# Patient Record
Sex: MALE | Race: WHITE | NOT HISPANIC OR LATINO | Employment: OTHER | URBAN - METROPOLITAN AREA
[De-identification: names, ages, dates, MRNs, and addresses within clinical notes are randomized per-mention and may not be internally consistent; named-entity substitution may affect disease eponyms.]

---

## 2022-12-04 SDOH — ECONOMIC STABILITY: TRANSPORTATION INSECURITY
IN THE PAST 12 MONTHS, HAS LACK OF TRANSPORTATION KEPT YOU FROM MEETINGS, WORK, OR FROM GETTING THINGS NEEDED FOR DAILY LIVING?: NO

## 2022-12-04 SDOH — ECONOMIC STABILITY: HOUSING INSECURITY
IN THE LAST 12 MONTHS, WAS THERE A TIME WHEN YOU DID NOT HAVE A STEADY PLACE TO SLEEP OR SLEPT IN A SHELTER (INCLUDING NOW)?: NO

## 2022-12-04 SDOH — ECONOMIC STABILITY: FOOD INSECURITY: WITHIN THE PAST 12 MONTHS, YOU WORRIED THAT YOUR FOOD WOULD RUN OUT BEFORE YOU GOT MONEY TO BUY MORE.: NEVER TRUE

## 2022-12-04 SDOH — ECONOMIC STABILITY: FOOD INSECURITY: WITHIN THE PAST 12 MONTHS, THE FOOD YOU BOUGHT JUST DIDN'T LAST AND YOU DIDN'T HAVE MONEY TO GET MORE.: NEVER TRUE

## 2022-12-04 SDOH — ECONOMIC STABILITY: INCOME INSECURITY: IN THE LAST 12 MONTHS, WAS THERE A TIME WHEN YOU WERE NOT ABLE TO PAY THE MORTGAGE OR RENT ON TIME?: NO

## 2022-12-04 SDOH — HEALTH STABILITY: PHYSICAL HEALTH: ON AVERAGE, HOW MANY MINUTES DO YOU ENGAGE IN EXERCISE AT THIS LEVEL?: 50 MIN

## 2022-12-04 SDOH — HEALTH STABILITY: MENTAL HEALTH
STRESS IS WHEN SOMEONE FEELS TENSE, NERVOUS, ANXIOUS, OR CAN'T SLEEP AT NIGHT BECAUSE THEIR MIND IS TROUBLED. HOW STRESSED ARE YOU?: ONLY A LITTLE

## 2022-12-04 SDOH — ECONOMIC STABILITY: INCOME INSECURITY: HOW HARD IS IT FOR YOU TO PAY FOR THE VERY BASICS LIKE FOOD, HOUSING, MEDICAL CARE, AND HEATING?: NOT VERY HARD

## 2022-12-04 SDOH — ECONOMIC STABILITY: HOUSING INSECURITY: IN THE LAST 12 MONTHS, HOW MANY PLACES HAVE YOU LIVED?: 1

## 2022-12-04 SDOH — ECONOMIC STABILITY: TRANSPORTATION INSECURITY
IN THE PAST 12 MONTHS, HAS THE LACK OF TRANSPORTATION KEPT YOU FROM MEDICAL APPOINTMENTS OR FROM GETTING MEDICATIONS?: NO

## 2022-12-04 SDOH — HEALTH STABILITY: PHYSICAL HEALTH: ON AVERAGE, HOW MANY DAYS PER WEEK DO YOU ENGAGE IN MODERATE TO STRENUOUS EXERCISE (LIKE A BRISK WALK)?: 7 DAYS

## 2022-12-04 SDOH — ECONOMIC STABILITY: TRANSPORTATION INSECURITY
IN THE PAST 12 MONTHS, HAS LACK OF RELIABLE TRANSPORTATION KEPT YOU FROM MEDICAL APPOINTMENTS, MEETINGS, WORK OR FROM GETTING THINGS NEEDED FOR DAILY LIVING?: NO

## 2022-12-04 ASSESSMENT — LIFESTYLE VARIABLES
HOW OFTEN DO YOU HAVE A DRINK CONTAINING ALCOHOL: 2-3 TIMES A WEEK
HOW OFTEN DO YOU HAVE SIX OR MORE DRINKS ON ONE OCCASION: NEVER
AUDIT-C TOTAL SCORE: 3
HOW MANY STANDARD DRINKS CONTAINING ALCOHOL DO YOU HAVE ON A TYPICAL DAY: 1 OR 2
SKIP TO QUESTIONS 9-10: 1

## 2022-12-04 ASSESSMENT — SOCIAL DETERMINANTS OF HEALTH (SDOH)
IN A TYPICAL WEEK, HOW MANY TIMES DO YOU TALK ON THE PHONE WITH FAMILY, FRIENDS, OR NEIGHBORS?: MORE THAN THREE TIMES A WEEK
DO YOU BELONG TO ANY CLUBS OR ORGANIZATIONS SUCH AS CHURCH GROUPS UNIONS, FRATERNAL OR ATHLETIC GROUPS, OR SCHOOL GROUPS?: NO
HOW MANY DRINKS CONTAINING ALCOHOL DO YOU HAVE ON A TYPICAL DAY WHEN YOU ARE DRINKING: 1 OR 2
IN A TYPICAL WEEK, HOW MANY TIMES DO YOU TALK ON THE PHONE WITH FAMILY, FRIENDS, OR NEIGHBORS?: MORE THAN THREE TIMES A WEEK
HOW OFTEN DO YOU HAVE SIX OR MORE DRINKS ON ONE OCCASION: NEVER
HOW OFTEN DO YOU GET TOGETHER WITH FRIENDS OR RELATIVES?: MORE THAN THREE TIMES A WEEK
HOW OFTEN DO YOU ATTEND CHURCH OR RELIGIOUS SERVICES?: 1 TO 4 TIMES PER YEAR
HOW OFTEN DO YOU GET TOGETHER WITH FRIENDS OR RELATIVES?: MORE THAN THREE TIMES A WEEK
WITHIN THE PAST 12 MONTHS, YOU WORRIED THAT YOUR FOOD WOULD RUN OUT BEFORE YOU GOT THE MONEY TO BUY MORE: NEVER TRUE
HOW HARD IS IT FOR YOU TO PAY FOR THE VERY BASICS LIKE FOOD, HOUSING, MEDICAL CARE, AND HEATING?: NOT VERY HARD
HOW OFTEN DO YOU HAVE A DRINK CONTAINING ALCOHOL: 2-3 TIMES A WEEK
HOW OFTEN DO YOU ATTENT MEETINGS OF THE CLUB OR ORGANIZATION YOU BELONG TO?: NEVER
HOW OFTEN DO YOU ATTEND CHURCH OR RELIGIOUS SERVICES?: 1 TO 4 TIMES PER YEAR
HOW OFTEN DO YOU ATTENT MEETINGS OF THE CLUB OR ORGANIZATION YOU BELONG TO?: NEVER
DO YOU BELONG TO ANY CLUBS OR ORGANIZATIONS SUCH AS CHURCH GROUPS UNIONS, FRATERNAL OR ATHLETIC GROUPS, OR SCHOOL GROUPS?: NO

## 2022-12-07 ENCOUNTER — OFFICE VISIT (OUTPATIENT)
Dept: MEDICAL GROUP | Facility: MEDICAL CENTER | Age: 72
End: 2022-12-07
Payer: MEDICARE

## 2022-12-07 VITALS
DIASTOLIC BLOOD PRESSURE: 72 MMHG | OXYGEN SATURATION: 98 % | TEMPERATURE: 98.6 F | SYSTOLIC BLOOD PRESSURE: 122 MMHG | WEIGHT: 173.72 LBS | HEART RATE: 83 BPM | HEIGHT: 69 IN | BODY MASS INDEX: 25.73 KG/M2

## 2022-12-07 DIAGNOSIS — L30.9 DERMATITIS: ICD-10-CM

## 2022-12-07 DIAGNOSIS — Z95.1 HISTORY OF CORONARY ARTERY BYPASS SURGERY: ICD-10-CM

## 2022-12-07 DIAGNOSIS — E78.5 HYPERLIPIDEMIA, UNSPECIFIED HYPERLIPIDEMIA TYPE: ICD-10-CM

## 2022-12-07 DIAGNOSIS — Z00.00 ENCOUNTER FOR MEDICAL EXAMINATION TO ESTABLISH CARE: ICD-10-CM

## 2022-12-07 DIAGNOSIS — N18.9 CHRONIC KIDNEY DISEASE, UNSPECIFIED CKD STAGE: ICD-10-CM

## 2022-12-07 DIAGNOSIS — G47.33 OSA (OBSTRUCTIVE SLEEP APNEA): ICD-10-CM

## 2022-12-07 DIAGNOSIS — E03.9 HYPOTHYROIDISM, UNSPECIFIED TYPE: ICD-10-CM

## 2022-12-07 DIAGNOSIS — Z11.59 NEED FOR HEPATITIS C SCREENING TEST: ICD-10-CM

## 2022-12-07 PROBLEM — I10 HYPERTENSION: Status: ACTIVE | Noted: 2022-12-07

## 2022-12-07 PROBLEM — I25.10 ARTERIOSCLEROSIS OF CORONARY ARTERY: Status: ACTIVE | Noted: 2022-12-07

## 2022-12-07 PROBLEM — Z95.828 HISTORY OF ARTERIAL BYPASS OF LOWER EXTREMITY: Status: ACTIVE | Noted: 2022-12-07

## 2022-12-07 PROBLEM — I71.40 ABDOMINAL AORTIC ANEURYSM (AAA) (HCC): Status: ACTIVE | Noted: 2022-12-07

## 2022-12-07 PROCEDURE — 99204 OFFICE O/P NEW MOD 45 MIN: CPT | Performed by: STUDENT IN AN ORGANIZED HEALTH CARE EDUCATION/TRAINING PROGRAM

## 2022-12-07 RX ORDER — ATORVASTATIN CALCIUM 40 MG/1
40 TABLET, FILM COATED ORAL NIGHTLY
COMMUNITY
End: 2023-01-06 | Stop reason: SDUPTHER

## 2022-12-07 RX ORDER — DILTIAZEM HYDROCHLORIDE 240 MG/1
240 CAPSULE, COATED, EXTENDED RELEASE ORAL DAILY
COMMUNITY
End: 2023-01-06 | Stop reason: SDUPTHER

## 2022-12-07 RX ORDER — LEVOTHYROXINE SODIUM 0.15 MG/1
150 TABLET ORAL
COMMUNITY
End: 2023-01-06 | Stop reason: SDUPTHER

## 2022-12-07 RX ORDER — RAMIPRIL 5 MG/1
5 CAPSULE ORAL DAILY
COMMUNITY
End: 2023-01-06 | Stop reason: SDUPTHER

## 2022-12-07 RX ORDER — OMEPRAZOLE 40 MG/1
40 CAPSULE, DELAYED RELEASE ORAL DAILY
COMMUNITY
End: 2023-01-06 | Stop reason: SDUPTHER

## 2022-12-07 RX ORDER — METOPROLOL SUCCINATE 25 MG/1
75 TABLET, EXTENDED RELEASE ORAL DAILY
COMMUNITY
End: 2023-01-06 | Stop reason: SDUPTHER

## 2022-12-07 RX ORDER — POTASSIUM CHLORIDE 1.5 G/1.58G
20 POWDER, FOR SOLUTION ORAL DAILY
COMMUNITY
End: 2023-01-06 | Stop reason: SDUPTHER

## 2022-12-07 ASSESSMENT — PATIENT HEALTH QUESTIONNAIRE - PHQ9: CLINICAL INTERPRETATION OF PHQ2 SCORE: 0

## 2022-12-07 NOTE — LETTER
[facility]  Lenora Ty M.D.  [address]  [place] [address]  Fax: [phone]   Authorization for Release/Disclosure of   Protected Health Information   Name: EVA FARR : 1950 SSN: [id number]   Address: [address] [place] [address] [address]  [place] [address] Phone:    [phone] (home)    I authorize the entity listed below to release/disclose the PHI below to:   [facility]/Lenora Ty M.D. and Lenora Ty M.D.   Provider or Entity Name:  [facility], [facility], [facility] Phone:      Fax:        Reason for request: continuity of care   Information to be released:    [ X ] LAST COLONOSCOPY,  including any PATH REPORT and follow-up  [ X ] LAST FIT/COLOGUARD RESULT [  ] LAST DEXA  [  ] LAST MAMMOGRAM  [  ] LAST PAP  [  ] LAST LABS [  ] RETINA EXAM REPORT  [  ] IMMUNIZATION RECORDS  [  ] Release all info      [  ] Check here and initial the line next to each item to release ALL health information INCLUDING  _____ Care and treatment for drug and / or alcohol abuse  _____ HIV testing, infection status, or AIDS  _____ Genetic Testing    DATES OF SERVICE OR TIME PERIOD TO BE DISCLOSED: _____________  I understand and acknowledge that:  * This Authorization may be revoked at any time by you in writing, except if your health information has already been used or disclosed.  * Your health information that will be used or disclosed as a result of you signing this authorization could be re-disclosed by the recipient. If this occurs, your re-disclosed health information may no longer be protected by State or Federal laws.  * You may refuse to sign this Authorization. Your refusal will not affect your ability to obtain treatment.  * This Authorization becomes effective upon signing and will  on (date) __________.      If no date is indicated, this Authorization will  one (1) year from the signature date.    Name: Eva  Ball    Signature:   Date:     12/7/2022       PLEASE FAX REQUESTED RECORDS BACK TO: [phone]

## 2022-12-07 NOTE — PROGRESS NOTES
"Subjective:     CC:  Diagnoses of Encounter for medical examination to establish care, Dermatitis, DHAVAL (obstructive sleep apnea), Chronic kidney disease, unspecified CKD stage, Hyperlipidemia, unspecified hyperlipidemia type, History of coronary artery bypass surgery, Hypothyroidism, unspecified type, and Need for hepatitis C screening test were pertinent to this visit.    HISTORY OF THE PRESENT ILLNESS: Patient is a 72 y.o. male. This pleasant patient is here today to establish care and discuss the following.    Problem   History of Arterial Bypass of Lower Extremity   History of Coronary Artery Bypass Surgery    CABG in 2014.  Currently on aspirin for antiplatelet therapy, metoprolol,  and a statin.     Hyperlipidemia    Atorvastatin 40 mg nightly.  No recent lipid panel     Hypertension   Abdominal Aortic Aneurysm (Aaa)   Arteriosclerosis of Coronary Artery   Dermatitis    Previously saw derm, used topical steroids and steroid injection. [facility] in [place]     Dhaval (Obstructive Sleep Apnea)    Uses CPAP daily     Ckd (Chronic Kidney Disease)    Unsure of stage due to no recent labs. Followed with nephrologist in [facility].     Hypothyroidism    Currently taking Synthroid 150 mcg daily.  He states that this has been variable and frequent adjustments on this medication, no recent TSH         Health Maintenance: need to obtain records    ROS:   ROS      Objective:     Exam: /72   Pulse 83   Temp 37 °C (98.6 °F) (Temporal)   Ht 1.753 m (5' 9\")   Wt 78.8 kg (173 lb 11.6 oz)   SpO2 98%  Body mass index is 25.65 kg/m².    Physical Exam  Constitutional:       General: He is not in acute distress.     Appearance: He is not toxic-appearing.   HENT:      Head: Normocephalic and atraumatic.      Right Ear: External ear normal.      Left Ear: External ear normal.   Eyes:      General:         Right eye: No discharge.         Left eye: No discharge.      Extraocular Movements: Extraocular " movements intact.      Conjunctiva/sclera: Conjunctivae normal.   Cardiovascular:      Rate and Rhythm: Normal rate and regular rhythm.      Pulses: Normal pulses.      Heart sounds: Normal heart sounds. No murmur heard.  Pulmonary:      Effort: Pulmonary effort is normal. No respiratory distress.      Breath sounds: Normal breath sounds. No wheezing.   Abdominal:      General: Abdomen is flat. Bowel sounds are normal. There is no distension.      Palpations: Abdomen is soft.      Tenderness: There is no abdominal tenderness.   Musculoskeletal:      Cervical back: Normal range of motion.   Skin:     General: Skin is warm and dry.      Capillary Refill: Capillary refill takes less than 2 seconds.   Neurological:      Mental Status: He is alert.   Psychiatric:         Mood and Affect: Mood normal.         Behavior: Behavior normal.         Thought Content: Thought content normal.         Judgment: Judgment normal.         Assessment & Plan:   72 y.o. male with the following -    1. Encounter for medical examination to establish care  Medical history, problem list, medications and allergies reviewed    2. Dermatitis  Referral to dermatology sent  - Referral to Dermatology    3. SILVIO (obstructive sleep apnea)  Continue using CPAP    4. Chronic kidney disease, unspecified CKD stage  CMP to assess CKD staging, if necessary will send to nephrology  - Comp Metabolic Panel; Future    5. Hyperlipidemia, unspecified hyperlipidemia type  Continue atorvastatin at current dosing, lipid profile done today  - Lipid Profile; Future    6. History of coronary artery bypass surgery  Continue aspirin for antiplatelet, atorvastatin and metoprolol at current dosing    7. Hypothyroidism, unspecified type  Continue levothyroxine at current dosing, will adjust based on TSH  - TSH WITH REFLEX TO FT4; Future    8. Need for hepatitis C screening test  - HEP C VIRUS ANTIBODY; Future      Return in about 4 weeks (around 1/4/2023).    Please note  that this dictation was created using voice recognition software. I have made every reasonable attempt to correct obvious errors, but I expect that there are errors of grammar and possibly content that I did not discover before finalizing the note.

## 2022-12-07 NOTE — LETTER
[facility]  Lenora Ty M.D.  [address]  [place] [address]  Fax: [phone]   Authorization for Release/Disclosure of   Protected Health Information   Name: EVA FARR : 1950 SSN: [id number]   Address: [address] [place] [address] [address]  [place] [address] Phone:    [phone] (home)    I authorize the entity listed below to release/disclose the PHI below to:   [facility]/Lenora yT M.D. and Lenora Ty M.D.   Provider or Entity Name:  {[facility] COLORECTAL SCREENING LOCATIONS:[id number]}   Reason for request: continuity of care   Information to be released:    [ X ] LAST COLONOSCOPY,  including any PATH REPORT and follow-up  [ X ] LAST FIT/COLOGUARD RESULT [  ] LAST DEXA  [  ] LAST MAMMOGRAM  [  ] LAST PAP  [  ] LAST LABS [  ] RETINA EXAM REPORT  [  ] IMMUNIZATION RECORDS  [  ] Release all info      [  ] Check here and initial the line next to each item to release ALL health information INCLUDING  _____ Care and treatment for drug and / or alcohol abuse  _____ HIV testing, infection status, or AIDS  _____ Genetic Testing    DATES OF SERVICE OR TIME PERIOD TO BE DISCLOSED: _____________  I understand and acknowledge that:  * This Authorization may be revoked at any time by you in writing, except if your health information has already been used or disclosed.  * Your health information that will be used or disclosed as a result of you signing this authorization could be re-disclosed by the recipient. If this occurs, your re-disclosed health information may no longer be protected by State or Federal laws.  * You may refuse to sign this Authorization. Your refusal will not affect your ability to obtain treatment.  * This Authorization becomes effective upon signing and will  on (date) __________.      If no date is indicated, this Authorization will  one (1) year from the signature date.    Name: Eva Farr    Signature:   Date:     2022       PLEASE FAX  REQUESTED RECORDS BACK TO: [phone]

## 2022-12-14 ENCOUNTER — PATIENT MESSAGE (OUTPATIENT)
Dept: MEDICAL GROUP | Facility: MEDICAL CENTER | Age: 72
End: 2022-12-14
Payer: MEDICARE

## 2022-12-14 ENCOUNTER — HOSPITAL ENCOUNTER (OUTPATIENT)
Dept: LAB | Facility: MEDICAL CENTER | Age: 72
End: 2022-12-14
Attending: STUDENT IN AN ORGANIZED HEALTH CARE EDUCATION/TRAINING PROGRAM
Payer: MEDICARE

## 2022-12-14 DIAGNOSIS — Z11.59 NEED FOR HEPATITIS C SCREENING TEST: ICD-10-CM

## 2022-12-14 DIAGNOSIS — N18.9 CHRONIC KIDNEY DISEASE, UNSPECIFIED CKD STAGE: ICD-10-CM

## 2022-12-14 DIAGNOSIS — E78.5 HYPERLIPIDEMIA, UNSPECIFIED HYPERLIPIDEMIA TYPE: ICD-10-CM

## 2022-12-14 DIAGNOSIS — E03.9 HYPOTHYROIDISM, UNSPECIFIED TYPE: ICD-10-CM

## 2022-12-14 LAB
ALBUMIN SERPL BCP-MCNC: 4.3 G/DL (ref 3.2–4.9)
ALBUMIN/GLOB SERPL: 1.2 G/DL
ALP SERPL-CCNC: 97 U/L (ref 30–99)
ALT SERPL-CCNC: 15 U/L (ref 2–50)
ANION GAP SERPL CALC-SCNC: 12 MMOL/L (ref 7–16)
AST SERPL-CCNC: 18 U/L (ref 12–45)
BILIRUB SERPL-MCNC: 0.5 MG/DL (ref 0.1–1.5)
BUN SERPL-MCNC: 22 MG/DL (ref 8–22)
CALCIUM ALBUM COR SERPL-MCNC: 9.1 MG/DL (ref 8.5–10.5)
CALCIUM SERPL-MCNC: 9.3 MG/DL (ref 8.4–10.2)
CHLORIDE SERPL-SCNC: 102 MMOL/L (ref 96–112)
CHOLEST SERPL-MCNC: 171 MG/DL (ref 100–199)
CO2 SERPL-SCNC: 26 MMOL/L (ref 20–33)
CREAT SERPL-MCNC: 1.53 MG/DL (ref 0.5–1.4)
FASTING STATUS PATIENT QL REPORTED: NORMAL
GFR SERPLBLD CREATININE-BSD FMLA CKD-EPI: 48 ML/MIN/1.73 M 2
GLOBULIN SER CALC-MCNC: 3.5 G/DL (ref 1.9–3.5)
GLUCOSE SERPL-MCNC: 110 MG/DL (ref 65–99)
HCV AB SER QL: NORMAL
HDLC SERPL-MCNC: 60 MG/DL
LDLC SERPL CALC-MCNC: 100 MG/DL
POTASSIUM SERPL-SCNC: 4.1 MMOL/L (ref 3.6–5.5)
PROT SERPL-MCNC: 7.8 G/DL (ref 6–8.2)
SODIUM SERPL-SCNC: 140 MMOL/L (ref 135–145)
TRIGL SERPL-MCNC: 57 MG/DL (ref 0–149)
TSH SERPL DL<=0.005 MIU/L-ACNC: 5.26 UIU/ML (ref 0.38–5.33)

## 2022-12-14 PROCEDURE — 84443 ASSAY THYROID STIM HORMONE: CPT

## 2022-12-14 PROCEDURE — 86803 HEPATITIS C AB TEST: CPT

## 2022-12-14 PROCEDURE — 80061 LIPID PANEL: CPT

## 2022-12-14 PROCEDURE — 80053 COMPREHEN METABOLIC PANEL: CPT

## 2022-12-14 PROCEDURE — 36415 COLL VENOUS BLD VENIPUNCTURE: CPT

## 2022-12-14 NOTE — LETTER
[facility]  Lenora Ty M.D.  [address]  [place] [address]  Fax: [phone]   Authorization for Release/Disclosure of   Protected Health Information   Name: EVA FARR : 1950 SSN: [id number]   Address: [address] [place] [address] [address]  [place] [address] Phone:    [phone] (home)    I authorize the entity listed below to release/disclose the PHI below to:   [facility]/Lenora Ty M.D. and Lenora Ty M.D.   Provider or Entity Name:  Dr. Sejal Cantrell    Address   City, State, Zip   Phone:      Fax:  [phone]   Reason for request: continuity of care   Information to be released:    [  ] LAST COLONOSCOPY,  including any PATH REPORT and follow-up  [  ] LAST FIT/COLOGUARD RESULT [  ] LAST DEXA  [  ] LAST MAMMOGRAM  [  ] LAST PAP  [  ] LAST LABS [  ] RETINA EXAM REPORT  [  ] IMMUNIZATION RECORDS  [  ] Release all info      [  ] Check here and initial the line next to each item to release ALL health information INCLUDING  _____ Care and treatment for drug and / or alcohol abuse  _____ HIV testing, infection status, or AIDS  _____ Genetic Testing    DATES OF SERVICE OR TIME PERIOD TO BE DISCLOSED: _____________  I understand and acknowledge that:  * This Authorization may be revoked at any time by you in writing, except if your health information has already been used or disclosed.  * Your health information that will be used or disclosed as a result of you signing this authorization could be re-disclosed by the recipient. If this occurs, your re-disclosed health information may no longer be protected by State or Federal laws.  * You may refuse to sign this Authorization. Your refusal will not affect your ability to obtain treatment.  * This Authorization becomes effective upon signing and will  on (date) __________.      If no date is indicated, this Authorization will  one (1) year from the signature date.    Name: Eva Farr    Signature:    Date:     12/14/2022       PLEASE FAX REQUESTED RECORDS BACK TO: [phone]

## 2022-12-14 NOTE — LETTER
[employer]  Lenora Ty M.D.  [address]  [place] [address]  Fax: [phone]   Authorization for Release/Disclosure of   Protected Health Information   Name: EVA FARR : 1950 SSN: [id number]   Address: [address] [place] [address] [address]  [place] [address] Phone:    [phone] (home)    I authorize the entity listed below to release/disclose the PHI below to:   [facility]/Lenora Ty M.D. and Lenora Ty M.D.   Provider or Entity Name:  Dr Adela Vásquez, [facility]   Address   [facility]   Phone:  [phone]    Fax:     Reason for request: continuity of care   Information to be released:    [  ] LAST COLONOSCOPY,  including any PATH REPORT and follow-up  [  ] LAST FIT/COLOGUARD RESULT [  ] LAST DEXA  [  ] LAST MAMMOGRAM  [  ] LAST PAP  [  ] LAST LABS [  ] RETINA EXAM REPORT  [  ] IMMUNIZATION RECORDS  [  ] Release all info      [  ] Check here and initial the line next to each item to release ALL health information INCLUDING  _____ Care and treatment for drug and / or alcohol abuse  _____ HIV testing, infection status, or AIDS  _____ Genetic Testing    DATES OF SERVICE OR TIME PERIOD TO BE DISCLOSED: _____________  I understand and acknowledge that:  * This Authorization may be revoked at any time by you in writing, except if your health information has already been used or disclosed.  * Your health information that will be used or disclosed as a result of you signing this authorization could be re-disclosed by the recipient. If this occurs, your re-disclosed health information may no longer be protected by State or Federal laws.  * You may refuse to sign this Authorization. Your refusal will not affect your ability to obtain treatment.  * This Authorization becomes effective upon signing and will  on (date) __________.      If no date is indicated, this Authorization will  one (1) year from the signature date.    Name: Eva  Ball    Signature:   Date:     12/14/2022       PLEASE FAX REQUESTED RECORDS BACK TO: [phone]

## 2022-12-14 NOTE — PROGRESS NOTES
There are 2 records releases that I filled out today but the patient needs to sign.  I spoke to him on the phone and he gave me the correct doctors and phone numbers.  Will need to come into the office to sign these forms that we can fax them to the correct doctors to get his records.  Please call the patient and let him know.    Thank You,  Dr. Ty

## 2023-01-06 ENCOUNTER — OFFICE VISIT (OUTPATIENT)
Dept: MEDICAL GROUP | Facility: MEDICAL CENTER | Age: 73
End: 2023-01-06
Payer: MEDICARE

## 2023-01-06 VITALS
RESPIRATION RATE: 17 BRPM | SYSTOLIC BLOOD PRESSURE: 110 MMHG | DIASTOLIC BLOOD PRESSURE: 78 MMHG | BODY MASS INDEX: 24.82 KG/M2 | WEIGHT: 167.55 LBS | HEART RATE: 87 BPM | TEMPERATURE: 98.3 F | OXYGEN SATURATION: 97 % | HEIGHT: 69 IN

## 2023-01-06 DIAGNOSIS — E78.00 PURE HYPERCHOLESTEROLEMIA: ICD-10-CM

## 2023-01-06 DIAGNOSIS — N18.31 STAGE 3A CHRONIC KIDNEY DISEASE: ICD-10-CM

## 2023-01-06 DIAGNOSIS — I71.40 ABDOMINAL AORTIC ANEURYSM (AAA) WITHOUT RUPTURE, UNSPECIFIED PART (HCC): ICD-10-CM

## 2023-01-06 DIAGNOSIS — Z76.0 MEDICATION REFILL: ICD-10-CM

## 2023-01-06 PROCEDURE — 99214 OFFICE O/P EST MOD 30 MIN: CPT | Performed by: STUDENT IN AN ORGANIZED HEALTH CARE EDUCATION/TRAINING PROGRAM

## 2023-01-06 RX ORDER — ATORVASTATIN CALCIUM 40 MG/1
40 TABLET, FILM COATED ORAL NIGHTLY
Qty: 90 TABLET | Refills: 3 | Status: SHIPPED | OUTPATIENT
Start: 2023-01-06

## 2023-01-06 RX ORDER — METOPROLOL SUCCINATE 25 MG/1
75 TABLET, EXTENDED RELEASE ORAL DAILY
Qty: 90 TABLET | Refills: 3 | Status: SHIPPED | OUTPATIENT
Start: 2023-01-06 | End: 2023-06-09

## 2023-01-06 RX ORDER — POTASSIUM CHLORIDE 1.5 G/1.58G
20 POWDER, FOR SOLUTION ORAL DAILY
Qty: 90 EACH | Refills: 3 | Status: SHIPPED | OUTPATIENT
Start: 2023-01-06

## 2023-01-06 RX ORDER — RAMIPRIL 5 MG/1
5 CAPSULE ORAL DAILY
Qty: 90 CAPSULE | Refills: 3 | Status: SHIPPED | OUTPATIENT
Start: 2023-01-06

## 2023-01-06 RX ORDER — LEVOTHYROXINE SODIUM 0.15 MG/1
150 TABLET ORAL
Qty: 90 TABLET | Refills: 3 | Status: SHIPPED | OUTPATIENT
Start: 2023-01-06

## 2023-01-06 RX ORDER — OMEPRAZOLE 40 MG/1
40 CAPSULE, DELAYED RELEASE ORAL DAILY
Qty: 90 CAPSULE | Refills: 3 | Status: SHIPPED | OUTPATIENT
Start: 2023-01-06 | End: 2023-09-28

## 2023-01-06 RX ORDER — DILTIAZEM HYDROCHLORIDE 240 MG/1
240 CAPSULE, COATED, EXTENDED RELEASE ORAL DAILY
Qty: 90 CAPSULE | Refills: 3 | Status: SHIPPED | OUTPATIENT
Start: 2023-01-06 | End: 2023-01-09

## 2023-01-06 ASSESSMENT — PATIENT HEALTH QUESTIONNAIRE - PHQ9: CLINICAL INTERPRETATION OF PHQ2 SCORE: 0

## 2023-01-06 NOTE — LETTER
[employer]  Lenora Ty M.D.  [address]  [place] [address]  Fax: [phone]   Authorization for Release/Disclosure of   Protected Health Information   Name: EVA FARR : 1950 SSN: [id number]   Address: [address] [place] [address] [address]  [place] [address] Phone:    [phone] (home)    I authorize the entity listed below to release/disclose the PHI below to:   [facility]/Lenora Ty M.D. and Lenora Ty M.D.   Provider or Entity Name:  [facility]   Address   City, [facility], [facility] Phone:      Fax:     Reason for request: continuity of care   Information to be released:    [  ] LAST COLONOSCOPY,  including any PATH REPORT and follow-up  [  ] LAST FIT/COLOGUARD RESULT [  ] LAST DEXA  [  ] LAST MAMMOGRAM  [  ] LAST PAP  [  ] LAST LABS [  ] RETINA EXAM REPORT  [  ] IMMUNIZATION RECORDS  [  ] Release all info      [  ] Check here and initial the line next to each item to release ALL health information INCLUDING  _____ Care and treatment for drug and / or alcohol abuse  _____ HIV testing, infection status, or AIDS  _____ Genetic Testing    DATES OF SERVICE OR TIME PERIOD TO BE DISCLOSED: _____________  I understand and acknowledge that:  * This Authorization may be revoked at any time by you in writing, except if your health information has already been used or disclosed.  * Your health information that will be used or disclosed as a result of you signing this authorization could be re-disclosed by the recipient. If this occurs, your re-disclosed health information may no longer be protected by State or Federal laws.  * You may refuse to sign this Authorization. Your refusal will not affect your ability to obtain treatment.  * This Authorization becomes effective upon signing and will  on (date) __________.      If no date is indicated, this Authorization will  one (1) year from the signature date.    Name: Eva  Ball    Signature:   Date:     1/6/2023       PLEASE FAX REQUESTED RECORDS BACK TO: [phone]

## 2023-01-06 NOTE — PROGRESS NOTES
"Subjective:     CC: Follow-up labs, kidney disease, high cholesterol    HPI:   Maxwell presents today with    Problem   Hyperlipidemia    Chronic, stable. Atorvastatin 40 mg nightly.     Lab Results   Component Value Date/Time    CHOLSTRLTOT 171 12/14/2022 07:52 AM     (H) 12/14/2022 07:52 AM    HDL 60 12/14/2022 07:52 AM    TRIGLYCERIDE 57 12/14/2022 07:52 AM              Abdominal Aortic Aneurysm (Aaa)    Repaired in 2012, revision with fem-fem bypass shortly after around 2013     Stage 3a Chronic Kidney Disease (Hcc)    GFR 48, creatinine 1.53.  Records from previous PCP and from previous nephrologist not available yet.  Continue Altace 5 mg daily       ROS:  ROS    Objective:     Exam:  /78 (BP Location: Left arm, Patient Position: Sitting, BP Cuff Size: Adult)   Pulse 87   Temp 36.8 °C (98.3 °F) (Temporal)   Resp 17   Ht 1.753 m (5' 9\")   Wt 76 kg (167 lb 8.8 oz)   SpO2 97%   BMI 24.74 kg/m²  Body mass index is 24.74 kg/m².    Physical Exam  Vitals reviewed.   Constitutional:       General: He is not in acute distress.     Appearance: He is not toxic-appearing.   HENT:      Head: Normocephalic and atraumatic.      Right Ear: External ear normal.      Left Ear: External ear normal.   Eyes:      General:         Right eye: No discharge.         Left eye: No discharge.      Extraocular Movements: Extraocular movements intact.      Conjunctiva/sclera: Conjunctivae normal.   Cardiovascular:      Rate and Rhythm: Normal rate and regular rhythm.      Pulses: Normal pulses.      Heart sounds: Normal heart sounds. No murmur heard.  Pulmonary:      Effort: Pulmonary effort is normal. No respiratory distress.      Breath sounds: Normal breath sounds. No wheezing.   Abdominal:      General: Abdomen is flat. Bowel sounds are normal. There is no distension.      Palpations: Abdomen is soft.      Tenderness: There is no abdominal tenderness.   Musculoskeletal:      Cervical back: Normal range of motion. "   Skin:     General: Skin is warm and dry.      Capillary Refill: Capillary refill takes less than 2 seconds.   Neurological:      Mental Status: He is alert.   Psychiatric:         Mood and Affect: Mood normal.         Behavior: Behavior normal.         Thought Content: Thought content normal.         Judgment: Judgment normal.         Assessment & Plan:     72 y.o. male with the following -     1. Abdominal aortic aneurysm (AAA) without rupture, unspecified part  S/p repair, records from surgeon requested    2. Stage 3a chronic kidney disease (HCC)  Stage 3, records requested from previous nephrologist, patient declines referral at this time    3. Pure hypercholesterolemia  Continue atorvastatin 40mg daily    Return in about 6 months (around 7/6/2023).    Please note that this dictation was created using voice recognition software. I have made every reasonable attempt to correct obvious errors, but I expect that there are errors of grammar and possibly content that I did not discover before finalizing the note.

## 2023-01-06 NOTE — LETTER
[facility]  Lenora Ty M.D.  [address]  [place] [address]  Fax: [phone]   Authorization for Release/Disclosure of   Protected Health Information   Name: EVA FARR : 1950 SSN: [id number]   Address: [address] [place] [address] [address]  [place] [address] Phone:    [phone] (home)    I authorize the entity listed below to release/disclose the PHI below to:   [facility]/Lenora Ty M.D. and Lenora Ty M.D.   Provider or Entity Name:  [facility]   Address   City, [facility], [facility]   Phone:  [phone]    Fax:     Reason for request: continuity of care   Information to be released:    [  ] LAST COLONOSCOPY,  including any PATH REPORT and follow-up  [  ] LAST FIT/COLOGUARD RESULT [  ] LAST DEXA  [  ] LAST MAMMOGRAM  [  ] LAST PAP  [  ] LAST LABS [  ] RETINA EXAM REPORT  [  ] IMMUNIZATION RECORDS  [  ] Release all info      [  ] Check here and initial the line next to each item to release ALL health information INCLUDING  _____ Care and treatment for drug and / or alcohol abuse  _____ HIV testing, infection status, or AIDS  _____ Genetic Testing    DATES OF SERVICE OR TIME PERIOD TO BE DISCLOSED: _____________  I understand and acknowledge that:  * This Authorization may be revoked at any time by you in writing, except if your health information has already been used or disclosed.  * Your health information that will be used or disclosed as a result of you signing this authorization could be re-disclosed by the recipient. If this occurs, your re-disclosed health information may no longer be protected by State or Federal laws.  * You may refuse to sign this Authorization. Your refusal will not affect your ability to obtain treatment.  * This Authorization becomes effective upon signing and will  on (date) __________.      If no date is indicated, this Authorization will  one (1) year from the signature date.    Name: Eva Farr    Signature:    Date:     1/6/2023       PLEASE FAX REQUESTED RECORDS BACK TO: [phone]

## 2023-01-06 NOTE — LETTER
[employer]  Lenora Ty M.D.  [address]  [place] [address]  Fax: [phone]   Authorization for Release/Disclosure of   Protected Health Information   Name: EVA FARR : 1950 SSN: [id number]   Address: [address] [place] [address] [address]  [place] [address] Phone:    [phone] (home)    I authorize the entity listed below to release/disclose the PHI below to:   [facility]/Lenora Ty M.D. and Lenora Ty M.D.   Provider or Entity Name:  Dr. Elyssa Mayo   Address   City, [facility], [place], [place] Phone:   [phone]    Fax:     Reason for request: continuity of care   Information to be released:    [  ] LAST COLONOSCOPY,  including any PATH REPORT and follow-up  [  ] LAST FIT/COLOGUARD RESULT [  ] LAST DEXA  [  ] LAST MAMMOGRAM  [  ] LAST PAP  [  ] LAST LABS [  ] RETINA EXAM REPORT  [  ] IMMUNIZATION RECORDS  [  ] Release all info      [  ] Check here and initial the line next to each item to release ALL health information INCLUDING  _____ Care and treatment for drug and / or alcohol abuse  _____ HIV testing, infection status, or AIDS  _____ Genetic Testing    DATES OF SERVICE OR TIME PERIOD TO BE DISCLOSED: _____________  I understand and acknowledge that:  * This Authorization may be revoked at any time by you in writing, except if your health information has already been used or disclosed.  * Your health information that will be used or disclosed as a result of you signing this authorization could be re-disclosed by the recipient. If this occurs, your re-disclosed health information may no longer be protected by State or Federal laws.  * You may refuse to sign this Authorization. Your refusal will not affect your ability to obtain treatment.  * This Authorization becomes effective upon signing and will  on (date) __________.      If no date is indicated, this Authorization will  one (1) year from the signature date.    Name: Eva  Ball    Signature:   Date:     1/6/2023       PLEASE FAX REQUESTED RECORDS BACK TO: [phone]

## 2023-01-07 DIAGNOSIS — Z76.0 MEDICATION REFILL: ICD-10-CM

## 2023-01-09 RX ORDER — DILTIAZEM HYDROCHLORIDE 240 MG/1
240 CAPSULE, COATED, EXTENDED RELEASE ORAL DAILY
Qty: 90 CAPSULE | Refills: 3 | Status: SHIPPED | OUTPATIENT
Start: 2023-01-09 | End: 2023-09-28

## 2023-01-09 NOTE — TELEPHONE ENCOUNTER
Received request via: Pharmacy    Was the patient seen in the last year in this department? Yes    Does the patient have an active prescription (recently filled or refills available) for medication(s) requested? No    Does the patient have [facility] Plus and need 100 day supply (blood pressure, diabetes and cholesterol meds only)? Patient does not have SCP    Pharmacy comment: Please verify the dose form on Diltiazem 240mg. Which version do you wish to prescribe: Generic for CARDIZEM CD, CARDIZEM LA, DILACOR XR, or TIAZAC? Patient has a history of the LA tablets.   Please verify the dose form on Diltiazem 240mg. Which version do you wish to prescribe: Generic for CARDIZEM CD, CARDIZEM LA, DILACOR XR, or TIAZAC? Patient has a history of the LA tablets.   25.9

## 2023-07-06 ENCOUNTER — APPOINTMENT (OUTPATIENT)
Dept: MEDICAL GROUP | Facility: MEDICAL CENTER | Age: 73
End: 2023-07-06
Payer: MEDICARE

## 2023-07-11 ENCOUNTER — OFFICE VISIT (OUTPATIENT)
Dept: MEDICAL GROUP | Facility: MEDICAL CENTER | Age: 73
End: 2023-07-11
Payer: MEDICARE

## 2023-07-11 VITALS
RESPIRATION RATE: 16 BRPM | HEART RATE: 90 BPM | BODY MASS INDEX: 23.18 KG/M2 | WEIGHT: 156.53 LBS | DIASTOLIC BLOOD PRESSURE: 62 MMHG | TEMPERATURE: 98.4 F | OXYGEN SATURATION: 99 % | HEIGHT: 69 IN | SYSTOLIC BLOOD PRESSURE: 138 MMHG

## 2023-07-11 DIAGNOSIS — E78.00 PURE HYPERCHOLESTEROLEMIA: ICD-10-CM

## 2023-07-11 DIAGNOSIS — N18.31 STAGE 3A CHRONIC KIDNEY DISEASE: ICD-10-CM

## 2023-07-11 DIAGNOSIS — I10 PRIMARY HYPERTENSION: ICD-10-CM

## 2023-07-11 DIAGNOSIS — E03.9 HYPOTHYROIDISM, UNSPECIFIED TYPE: ICD-10-CM

## 2023-07-11 DIAGNOSIS — Z23 IMMUNIZATION DUE: ICD-10-CM

## 2023-07-11 PROCEDURE — 90472 IMMUNIZATION ADMIN EACH ADD: CPT | Performed by: STUDENT IN AN ORGANIZED HEALTH CARE EDUCATION/TRAINING PROGRAM

## 2023-07-11 PROCEDURE — 99214 OFFICE O/P EST MOD 30 MIN: CPT | Mod: 25 | Performed by: STUDENT IN AN ORGANIZED HEALTH CARE EDUCATION/TRAINING PROGRAM

## 2023-07-11 PROCEDURE — 3078F DIAST BP <80 MM HG: CPT | Performed by: STUDENT IN AN ORGANIZED HEALTH CARE EDUCATION/TRAINING PROGRAM

## 2023-07-11 PROCEDURE — 90715 TDAP VACCINE 7 YRS/> IM: CPT | Performed by: STUDENT IN AN ORGANIZED HEALTH CARE EDUCATION/TRAINING PROGRAM

## 2023-07-11 PROCEDURE — 90677 PCV20 VACCINE IM: CPT | Performed by: STUDENT IN AN ORGANIZED HEALTH CARE EDUCATION/TRAINING PROGRAM

## 2023-07-11 PROCEDURE — G0009 ADMIN PNEUMOCOCCAL VACCINE: HCPCS | Performed by: STUDENT IN AN ORGANIZED HEALTH CARE EDUCATION/TRAINING PROGRAM

## 2023-07-11 PROCEDURE — 3075F SYST BP GE 130 - 139MM HG: CPT | Performed by: STUDENT IN AN ORGANIZED HEALTH CARE EDUCATION/TRAINING PROGRAM

## 2023-07-11 NOTE — PROGRESS NOTES
"Subjective:     CC: HLD, HTN, CKD, Hypothyroidism    HPI:   Maxwell presents today with    Problem   Hyperlipidemia    Chronic, stable. Atorvastatin 40 mg nightly.      Lab Results   Component Value Date/Time    CHOLSTRLTOT 171 12/14/2022 07:52 AM     (H) 12/14/2022 07:52 AM    HDL 60 12/14/2022 07:52 AM    TRIGLYCERIDE 57 12/14/2022 07:52 AM              Hypertension    This is a chronic condition, currently on diltiazem 240 mg daily, metoprolol 75 mg daily, and ramipril 5 mg daily     Stage 3a Chronic Kidney Disease (Hcc)    Chronic, GFR 48, creatinine 1.53.  Continue Altace 5 mg daily     Hypothyroidism    Chronic, stable, Currently taking Synthroid 150 mcg daily.         ROS:  ROS    Objective:     Exam:  /62   Pulse 90   Temp 36.9 °C (98.4 °F) (Temporal)   Resp 16   Ht 1.753 m (5' 9\")   Wt 71 kg (156 lb 8.4 oz)   SpO2 99%   BMI 23.11 kg/m²  Body mass index is 23.11 kg/m².    Physical Exam  Vitals reviewed.   Constitutional:       General: He is not in acute distress.     Appearance: He is not toxic-appearing.   HENT:      Head: Normocephalic and atraumatic.      Right Ear: External ear normal.      Left Ear: External ear normal.   Eyes:      General:         Right eye: No discharge.         Left eye: No discharge.      Extraocular Movements: Extraocular movements intact.      Conjunctiva/sclera: Conjunctivae normal.   Cardiovascular:      Rate and Rhythm: Normal rate and regular rhythm.      Heart sounds: Normal heart sounds. No murmur heard.     No gallop.   Pulmonary:      Effort: Pulmonary effort is normal. No respiratory distress.      Breath sounds: Normal breath sounds. No wheezing or rales.   Skin:     General: Skin is warm and dry.   Neurological:      Mental Status: He is alert.   Psychiatric:         Mood and Affect: Mood normal.         Behavior: Behavior normal.         Thought Content: Thought content normal.         Judgment: Judgment normal.           Assessment & Plan:     73 " y.o. male with the following -     1. Primary hypertension  Chronic, well controlled, continue metoprolol, Cardizem and Altace at current dosing  - Comp Metabolic Panel; Future    2. Hypothyroidism, unspecified type  Chronic, well controlled, continue levothyroxine at current dosing  - TSH WITH REFLEX TO FT4; Future    3. Stage 3a chronic kidney disease (HCC)  Chronic, recheck CMP  - Comp Metabolic Panel; Future    4. Pure hypercholesterolemia  Chronic, stable, recheck lipid profile, continue Lipitor 40 mg nightly  - Lipid Profile; Future    5. Immunization due  - Tdap =>6yo IM  - Pneumococcal Conjugate Vaccine 20-Valent (19 yrs+)      No follow-ups on file.    Please note that this dictation was created using voice recognition software. I have made every reasonable attempt to correct obvious errors, but I expect that there are errors of grammar and possibly content that I did not discover before finalizing the note.

## 2023-08-07 ENCOUNTER — PATIENT MESSAGE (OUTPATIENT)
Dept: MEDICAL GROUP | Facility: MEDICAL CENTER | Age: 73
End: 2023-08-07
Payer: MEDICARE

## 2023-08-07 DIAGNOSIS — N52.9 ERECTILE DYSFUNCTION, UNSPECIFIED ERECTILE DYSFUNCTION TYPE: ICD-10-CM

## 2023-08-08 ENCOUNTER — PATIENT MESSAGE (OUTPATIENT)
Dept: MEDICAL GROUP | Facility: MEDICAL CENTER | Age: 73
End: 2023-08-08
Payer: MEDICARE

## 2023-08-08 DIAGNOSIS — N52.9 ERECTILE DYSFUNCTION, UNSPECIFIED ERECTILE DYSFUNCTION TYPE: ICD-10-CM

## 2023-08-08 RX ORDER — VARDENAFIL 11.85 MG/1
TABLET ORAL
Qty: 30 TABLET | Refills: 0 | Status: SHIPPED | OUTPATIENT
Start: 2023-08-08 | End: 2023-08-10

## 2023-08-10 RX ORDER — SILDENAFIL 50 MG/1
50 TABLET, FILM COATED ORAL
Qty: 10 TABLET | Refills: 3 | Status: SHIPPED | OUTPATIENT
Start: 2023-08-10 | End: 2024-01-11

## 2023-09-27 DIAGNOSIS — Z76.0 MEDICATION REFILL: ICD-10-CM

## 2023-09-28 RX ORDER — DILTIAZEM HYDROCHLORIDE 240 MG/1
240 CAPSULE, COATED, EXTENDED RELEASE ORAL DAILY
Qty: 90 CAPSULE | Refills: 3 | Status: SHIPPED | OUTPATIENT
Start: 2023-09-28

## 2023-09-28 RX ORDER — OMEPRAZOLE 40 MG/1
40 CAPSULE, DELAYED RELEASE ORAL DAILY
Qty: 90 CAPSULE | Refills: 3 | Status: SHIPPED | OUTPATIENT
Start: 2023-09-28

## 2023-11-29 ENCOUNTER — PATIENT MESSAGE (OUTPATIENT)
Dept: HEALTH INFORMATION MANAGEMENT | Facility: OTHER | Age: 73
End: 2023-11-29

## 2023-11-30 ENCOUNTER — PATIENT MESSAGE (OUTPATIENT)
Dept: MEDICAL GROUP | Facility: MEDICAL CENTER | Age: 73
End: 2023-11-30
Payer: MEDICARE

## 2024-01-05 ENCOUNTER — HOSPITAL ENCOUNTER (OUTPATIENT)
Dept: LAB | Facility: MEDICAL CENTER | Age: 74
End: 2024-01-05
Attending: STUDENT IN AN ORGANIZED HEALTH CARE EDUCATION/TRAINING PROGRAM
Payer: MEDICARE

## 2024-01-05 DIAGNOSIS — N18.31 STAGE 3A CHRONIC KIDNEY DISEASE: ICD-10-CM

## 2024-01-05 DIAGNOSIS — I10 PRIMARY HYPERTENSION: ICD-10-CM

## 2024-01-05 DIAGNOSIS — E03.9 HYPOTHYROIDISM, UNSPECIFIED TYPE: ICD-10-CM

## 2024-01-05 DIAGNOSIS — E78.00 PURE HYPERCHOLESTEROLEMIA: ICD-10-CM

## 2024-01-05 LAB
ALBUMIN SERPL BCP-MCNC: 4.1 G/DL (ref 3.2–4.9)
ALBUMIN/GLOB SERPL: 1.2 G/DL
ALP SERPL-CCNC: 86 U/L (ref 30–99)
ALT SERPL-CCNC: 13 U/L (ref 2–50)
ANION GAP SERPL CALC-SCNC: 13 MMOL/L (ref 7–16)
AST SERPL-CCNC: 17 U/L (ref 12–45)
BILIRUB SERPL-MCNC: 0.6 MG/DL (ref 0.1–1.5)
BUN SERPL-MCNC: 20 MG/DL (ref 8–22)
CALCIUM ALBUM COR SERPL-MCNC: 8.9 MG/DL (ref 8.5–10.5)
CALCIUM SERPL-MCNC: 9 MG/DL (ref 8.4–10.2)
CHLORIDE SERPL-SCNC: 102 MMOL/L (ref 96–112)
CHOLEST SERPL-MCNC: 171 MG/DL (ref 100–199)
CO2 SERPL-SCNC: 26 MMOL/L (ref 20–33)
CREAT SERPL-MCNC: 1.41 MG/DL (ref 0.5–1.4)
FASTING STATUS PATIENT QL REPORTED: NORMAL
GFR SERPLBLD CREATININE-BSD FMLA CKD-EPI: 52 ML/MIN/1.73 M 2
GLOBULIN SER CALC-MCNC: 3.4 G/DL (ref 1.9–3.5)
GLUCOSE SERPL-MCNC: 96 MG/DL (ref 65–99)
HDLC SERPL-MCNC: 62 MG/DL
LDLC SERPL CALC-MCNC: 94 MG/DL
POTASSIUM SERPL-SCNC: 4.1 MMOL/L (ref 3.6–5.5)
PROT SERPL-MCNC: 7.5 G/DL (ref 6–8.2)
SODIUM SERPL-SCNC: 141 MMOL/L (ref 135–145)
TRIGL SERPL-MCNC: 74 MG/DL (ref 0–149)
TSH SERPL DL<=0.005 MIU/L-ACNC: 2.48 UIU/ML (ref 0.38–5.33)

## 2024-01-05 PROCEDURE — 36415 COLL VENOUS BLD VENIPUNCTURE: CPT

## 2024-01-05 PROCEDURE — 80053 COMPREHEN METABOLIC PANEL: CPT

## 2024-01-05 PROCEDURE — 80061 LIPID PANEL: CPT

## 2024-01-05 PROCEDURE — 84443 ASSAY THYROID STIM HORMONE: CPT

## 2024-01-11 ENCOUNTER — OFFICE VISIT (OUTPATIENT)
Dept: MEDICAL GROUP | Facility: MEDICAL CENTER | Age: 74
End: 2024-01-11
Payer: MEDICARE

## 2024-01-11 VITALS
WEIGHT: 158.2 LBS | SYSTOLIC BLOOD PRESSURE: 110 MMHG | OXYGEN SATURATION: 98 % | HEART RATE: 77 BPM | DIASTOLIC BLOOD PRESSURE: 60 MMHG | HEIGHT: 69 IN | TEMPERATURE: 97.7 F | BODY MASS INDEX: 23.43 KG/M2 | RESPIRATION RATE: 16 BRPM

## 2024-01-11 DIAGNOSIS — I10 PRIMARY HYPERTENSION: ICD-10-CM

## 2024-01-11 DIAGNOSIS — E03.9 HYPOTHYROIDISM, UNSPECIFIED TYPE: ICD-10-CM

## 2024-01-11 DIAGNOSIS — I71.40 ABDOMINAL AORTIC ANEURYSM (AAA) WITHOUT RUPTURE, UNSPECIFIED PART (HCC): ICD-10-CM

## 2024-01-11 DIAGNOSIS — N18.31 STAGE 3A CHRONIC KIDNEY DISEASE: ICD-10-CM

## 2024-01-11 DIAGNOSIS — E78.00 PURE HYPERCHOLESTEROLEMIA: ICD-10-CM

## 2024-01-11 DIAGNOSIS — N52.9 ERECTILE DYSFUNCTION, UNSPECIFIED ERECTILE DYSFUNCTION TYPE: ICD-10-CM

## 2024-01-11 DIAGNOSIS — Z23 IMMUNIZATION DUE: ICD-10-CM

## 2024-01-11 PROCEDURE — 99214 OFFICE O/P EST MOD 30 MIN: CPT | Performed by: STUDENT IN AN ORGANIZED HEALTH CARE EDUCATION/TRAINING PROGRAM

## 2024-01-11 PROCEDURE — 3074F SYST BP LT 130 MM HG: CPT | Performed by: STUDENT IN AN ORGANIZED HEALTH CARE EDUCATION/TRAINING PROGRAM

## 2024-01-11 PROCEDURE — 3078F DIAST BP <80 MM HG: CPT | Performed by: STUDENT IN AN ORGANIZED HEALTH CARE EDUCATION/TRAINING PROGRAM

## 2024-01-11 RX ORDER — TADALAFIL 10 MG/1
10 TABLET ORAL
Qty: 10 TABLET | Refills: 3 | Status: SHIPPED | OUTPATIENT
Start: 2024-01-11

## 2024-01-11 ASSESSMENT — PATIENT HEALTH QUESTIONNAIRE - PHQ9: CLINICAL INTERPRETATION OF PHQ2 SCORE: 0

## 2024-01-11 NOTE — PROGRESS NOTES
"Subjective:     CC: Rectal distention, hyperlipidemia, hypertension, abdominal aortic aneurysm, kidney disease, hypothyroidism    HPI:   Maxwell presents today with    Problem   Erectile Dysfunction    Chronic condition, tried Viagra which did not help, hoping to switch medication     Hyperlipidemia    Chronic, stable. Atorvastatin 40 mg nightly.      Lab Results   Component Value Date/Time    CHOLSTRLTOT 171 01/05/2024 10:29 AM    LDL 94 01/05/2024 10:29 AM    HDL 62 01/05/2024 10:29 AM    TRIGLYCERIDE 74 01/05/2024 10:29 AM              Hypertension    This is a chronic condition, well-controlled, currently on diltiazem 240 mg daily, metoprolol 75 mg daily, and ramipril 5 mg daily     Abdominal Aortic Aneurysm (Aaa) (Hcc)    Repaired in 2012, revision with fem-fem bypass shortly after around 2013. Needs annual aortic duplex last completed 7/2022, needs annual fem-fem duplex last done 7/2022.  Has not had evaluation since     Stage 3a Chronic Kidney Disease (Hcc)    Chronic, GFR 52, creatinine 1.41.  Continue Altace 5 mg daily     Hypothyroidism    Chronic, stable, Currently taking Synthroid 150 mcg daily.          ROS:  ROS    Objective:     Exam:  /60 (BP Location: Left arm, Patient Position: Sitting, BP Cuff Size: Adult)   Pulse 77   Temp 36.5 °C (97.7 °F) (Temporal)   Resp 16   Ht 1.753 m (5' 9\")   Wt 71.8 kg (158 lb 3.2 oz)   SpO2 98%   BMI 23.36 kg/m²  Body mass index is 23.36 kg/m².    Physical Exam  Vitals reviewed.   Constitutional:       General: He is not in acute distress.     Appearance: He is not toxic-appearing.      Comments: Exam through observation only   HENT:      Head: Normocephalic and atraumatic.      Right Ear: External ear normal.      Left Ear: External ear normal.   Eyes:      General:         Right eye: No discharge.         Left eye: No discharge.      Extraocular Movements: Extraocular movements intact.      Conjunctiva/sclera: Conjunctivae normal.   Pulmonary:      " Effort: Pulmonary effort is normal. No respiratory distress.   Skin:     General: Skin is warm and dry.   Neurological:      Mental Status: He is alert.   Psychiatric:         Mood and Affect: Mood normal.         Behavior: Behavior normal.         Thought Content: Thought content normal.         Judgment: Judgment normal.           Assessment & Plan:     73 y.o. male with the following -     1. Hypothyroidism, unspecified type  Continue current dose of levothyroxine    2. Primary hypertension  Continue metoprolol, diltiazem and Altace    3. Pure hypercholesterolemia  Continue atorvastatin at current dosing    4. Stage 3a chronic kidney disease (HCC)  Continue Altace, stable    5. Abdominal aortic aneurysm (AAA) without rupture, unspecified part (HCC)  Referral to vascular medicine sent for monitoring  - Referral to Vascular Medicine    6. Erectile dysfunction, unspecified erectile dysfunction type  Switch to Cialis.  Patient will let me know how this goes  - tadalafil (CIALIS) 10 MG tablet; Take 1 Tablet by mouth 1 time a day as needed for Erectile Dysfunction.  Dispense: 10 Tablet; Refill: 3    7. Immunization due  - injection RSV Pre-Fusion F A&B Vac Rcmb (ABRYSVO) 120 MCG/0.5ML Recon Soln; Inject 0.5 mL into the shoulder, thigh, or buttocks one time for 1 dose.  Dispense: 0.5 mL; Refill: 0    No follow-ups on file.    Please note that this dictation was created using voice recognition software. I have made every reasonable attempt to correct obvious errors, but I expect that there are errors of grammar and possibly content that I did not discover before finalizing the note.

## 2024-01-16 ENCOUNTER — TELEPHONE (OUTPATIENT)
Dept: VASCULAR LAB | Facility: MEDICAL CENTER | Age: 74
End: 2024-01-16
Payer: MEDICARE

## 2024-01-16 NOTE — TELEPHONE ENCOUNTER
Spoke to patient in regarding NP appointment with  Dr. Bloch.    Patient has seen a Vascular Provider before?  Yes at:    Not recieved, records request sent Dr. Kimbrough Vascular Surgeon in [facility]  (ph#[phone]) Patient needs to provide signed release to release records.    Any recent testing (labs or imaging) outside of [facility]?  No    Were any records requested?  NO    New patient packet sent via [facility] or mail?  Yes  Correct appointment type (New/Established/virtual)? Yes  Referral attached to appointment (renewals and New patients only)? Yes    Kerry Ho, Med Ass't  [facility]  Ph. [phone]  Fx. [phone]

## 2024-01-29 NOTE — TELEPHONE ENCOUNTER
Called and spoke with patient.  Patient will contact Records to sign release for Dr Kimbrough's office to release records to our vascular office. Provided Records ph#[phone].    Kerry Ho Med Ass't  [facility]  Ph. [phone]  Fx. [phone]

## 2024-01-30 ENCOUNTER — PATIENT MESSAGE (OUTPATIENT)
Dept: MEDICAL GROUP | Facility: MEDICAL CENTER | Age: 74
End: 2024-01-30
Payer: MEDICARE

## 2024-04-01 ENCOUNTER — PATIENT MESSAGE (OUTPATIENT)
Dept: MEDICAL GROUP | Facility: MEDICAL CENTER | Age: 74
End: 2024-04-01
Payer: MEDICARE

## 2024-04-01 DIAGNOSIS — N52.9 ERECTILE DYSFUNCTION, UNSPECIFIED ERECTILE DYSFUNCTION TYPE: ICD-10-CM

## 2024-04-01 RX ORDER — TADALAFIL 20 MG/1
20 TABLET ORAL PRN
Qty: 10 TABLET | Refills: 3 | Status: SHIPPED | OUTPATIENT
Start: 2024-04-01

## 2024-04-01 RX ORDER — TADALAFIL 20 MG/1
20 TABLET ORAL PRN
Qty: 10 TABLET | Refills: 3 | Status: SHIPPED | OUTPATIENT
Start: 2024-04-01 | End: 2024-04-01

## 2024-04-01 NOTE — TELEPHONE ENCOUNTER
Pharmacy comment: Please clarify the directions  for this prescription. please provide max daily dose.

## 2024-04-17 DIAGNOSIS — Z76.0 MEDICATION REFILL: ICD-10-CM

## 2024-04-17 RX ORDER — METOPROLOL SUCCINATE 25 MG/1
TABLET, EXTENDED RELEASE ORAL
Qty: 270 TABLET | Refills: 3 | Status: SHIPPED | OUTPATIENT
Start: 2024-04-17

## 2024-04-17 RX ORDER — LEVOTHYROXINE SODIUM 0.15 MG/1
150 TABLET ORAL
Qty: 90 TABLET | Refills: 3 | Status: SHIPPED | OUTPATIENT
Start: 2024-04-17

## 2024-04-17 NOTE — TELEPHONE ENCOUNTER
Received request via: Pharmacy    Was the patient seen in the last year in this department? Yes    Does the patient have an active prescription (recently filled or refills available) for medication(s) requested? No    [facility]    Does the patient have FPC Plus and need 100 day supply (blood pressure, diabetes and cholesterol meds only)? Patient does not have SCP

## 2024-05-06 ENCOUNTER — OFFICE VISIT (OUTPATIENT)
Dept: VASCULAR LAB | Facility: MEDICAL CENTER | Age: 74
End: 2024-05-06
Attending: INTERNAL MEDICINE
Payer: MEDICARE

## 2024-05-06 VITALS
BODY MASS INDEX: 23.4 KG/M2 | HEART RATE: 81 BPM | DIASTOLIC BLOOD PRESSURE: 75 MMHG | WEIGHT: 158 LBS | SYSTOLIC BLOOD PRESSURE: 156 MMHG | HEIGHT: 69 IN

## 2024-05-06 DIAGNOSIS — E78.00 PURE HYPERCHOLESTEROLEMIA: ICD-10-CM

## 2024-05-06 DIAGNOSIS — I73.9 PAD (PERIPHERAL ARTERY DISEASE) (HCC): ICD-10-CM

## 2024-05-06 DIAGNOSIS — N18.31 STAGE 3A CHRONIC KIDNEY DISEASE: ICD-10-CM

## 2024-05-06 DIAGNOSIS — I71.40 ABDOMINAL AORTIC ANEURYSM (AAA) WITHOUT RUPTURE, UNSPECIFIED PART (HCC): ICD-10-CM

## 2024-05-06 DIAGNOSIS — I10 PRIMARY HYPERTENSION: ICD-10-CM

## 2024-05-06 DIAGNOSIS — I25.10 ARTERIOSCLEROSIS OF CORONARY ARTERY: ICD-10-CM

## 2024-05-06 DIAGNOSIS — G47.33 OSA (OBSTRUCTIVE SLEEP APNEA): ICD-10-CM

## 2024-05-06 DIAGNOSIS — Z95.1 HISTORY OF CORONARY ARTERY BYPASS SURGERY: ICD-10-CM

## 2024-05-06 DIAGNOSIS — E03.9 HYPOTHYROIDISM, UNSPECIFIED TYPE: ICD-10-CM

## 2024-05-06 DIAGNOSIS — Z76.0 MEDICATION REFILL: ICD-10-CM

## 2024-05-06 PROCEDURE — 3078F DIAST BP <80 MM HG: CPT | Performed by: INTERNAL MEDICINE

## 2024-05-06 PROCEDURE — 99204 OFFICE O/P NEW MOD 45 MIN: CPT | Performed by: INTERNAL MEDICINE

## 2024-05-06 PROCEDURE — 3077F SYST BP >= 140 MM HG: CPT | Performed by: INTERNAL MEDICINE

## 2024-05-06 RX ORDER — METOPROLOL SUCCINATE 25 MG/1
25 TABLET, EXTENDED RELEASE ORAL
Qty: 270 TABLET | Refills: 3 | Status: SHIPPED
Start: 2024-05-06

## 2024-05-06 RX ORDER — EZETIMIBE 10 MG/1
10 TABLET ORAL DAILY
Qty: 90 TABLET | Refills: 3 | Status: SHIPPED | OUTPATIENT
Start: 2024-05-06

## 2024-05-06 RX ORDER — RAMIPRIL 5 MG/1
5 CAPSULE ORAL
Qty: 180 CAPSULE | Refills: 3 | Status: SHIPPED | OUTPATIENT
Start: 2024-05-06

## 2024-05-06 ASSESSMENT — ENCOUNTER SYMPTOMS
WEIGHT LOSS: 1
PALPITATIONS: 0
SPEECH CHANGE: 0
FOCAL WEAKNESS: 0

## 2024-05-06 NOTE — PROGRESS NOTES
VASCULAR MEDICINE CLINIC - INITIAL VISIT  05/06/24     Maxwell Salter is a 73 y.o. male  who has been referred for vascular medicine evaluation and management   Referring provider: Lenora Ty M.D.     Subjective      HPI:   Referred by pcp for e/m of AAA and PAD s/p EVAR and fem-fem bypass in setting of CAD s/p CABG, htn, and dyslipidemia with stage 3a ckd    CAD  1990s coronary pci  2014 had cabg  No chest pain  Great ex neil    AAA/PAD  EVAR in 2012  Had fem-fem 2014 for acute failure of distal limb  No recurrent procedures  No claudication    Dyslipidemia  Has been on atorvastatin for a long time   No myalgias    BP/ckd  Has tapered down metoprolol - not on 25 bid  Remains on low dose of ramipril - has never taken a higher dose  Stable stage 3a dz  Denies excess nsaids  BPs at home mostly 130s to 150s over 70s    SILVIO  Previously on cpap - not currently  Denies symptoms since losing weight      Patient Active Problem List    Diagnosis Date Noted    Erectile dysfunction 01/11/2024    History of arterial bypass of lower extremity 12/07/2022    History of coronary artery bypass surgery 12/07/2022    Hyperlipidemia 12/07/2022    Hypertension 12/07/2022    Abdominal aortic aneurysm (AAA) (HCC) 12/07/2022    Arteriosclerosis of coronary artery 12/07/2022    Dermatitis 12/07/2022    SILVIO (obstructive sleep apnea) 12/07/2022    Stage 3a chronic kidney disease 12/07/2022    Hypothyroidism 12/07/2022      Past Surgical History:   Procedure Laterality Date    CORONARY ARTERY BYPASS, 3  2014      Family History   Problem Relation Age of Onset    Heart Disease Brother       Current Outpatient Medications on File Prior to Visit   Medication Sig Dispense Refill    levothyroxine (SYNTHROID) 150 MCG Tab TAKE 1 TABLET EVERY MORNINGON AN EMPTY STOMACH 90 Tablet 3    tadalafil (CIALIS) 20 MG tablet TAKE 1 TABLET BY MOUTH AS NEEDED FOR ERECTILE DYSFUNCTION 10 Tablet 3    dilTIAZem CD (CARDIZEM CD) 240 MG CAPSULE SR 24 HR  "TAKE 1 CAPSULE DAILY 90 Capsule 3    omeprazole (PRILOSEC) 40 MG delayed-release capsule TAKE 1 CAPSULE DAILY 90 Capsule 3    Aspirin 81 MG Cap Take 81 mg by mouth every day. 90 Capsule 3    atorvastatin (LIPITOR) 40 MG Tab Take 1 Tablet by mouth every evening. 90 Tablet 3     No current facility-administered medications on file prior to visit.      ALLERGIES  Patient has no known allergies.    Social History     Tobacco Use    Smoking status: Former     Current packs/day: 0.00     Average packs/day: 1 pack/day for 43.7 years (43.7 ttl pk-yrs)     Types: Cigarettes     Start date: 1970     Quit date: 2014     Years since quittin.9    Smokeless tobacco: Never   Vaping Use    Vaping Use: Never used   Substance Use Topics    Alcohol use: Yes     Alcohol/week: 1.2 oz     Types: 2 Cans of beer per week    Drug use: Never     DIET AND EXERCISE:  Weight Change:down 30 pounds since moving to [place]  Diet: relatively low fat - whole foods  Exercise: avidly    Review of Systems   Constitutional:  Positive for weight loss. Negative for malaise/fatigue.   Cardiovascular:  Negative for chest pain, palpitations and leg swelling.   Neurological:  Negative for speech change and focal weakness.          Objective    Vitals:    24 1425 24 1429   BP: (!) 159/81 (!) 156/75   BP Location: Left arm Left arm   Patient Position: Sitting Sitting   BP Cuff Size: Adult Adult   Pulse: 82 81   Weight: 71.7 kg (158 lb)    Height: 1.753 m (5' 9\")       BP Readings from Last 4 Encounters:   24 (!) 156/75   24 110/60   23 138/62   23 110/78      Body mass index is 23.33 kg/m².   Wt Readings from Last 4 Encounters:   24 71.7 kg (158 lb)   24 71.8 kg (158 lb 3.2 oz)   23 71 kg (156 lb 8.4 oz)   23 76 kg (167 lb 8.8 oz)      Physical Exam  Vitals reviewed.   Constitutional:       General: He is not in acute distress.     Appearance: He is not diaphoretic.   HENT:      Head: " "Normocephalic and atraumatic.   Eyes:      General: No scleral icterus.     Conjunctiva/sclera: Conjunctivae normal.   Neck:      Vascular: No carotid bruit.   Cardiovascular:      Rate and Rhythm: Normal rate and regular rhythm.      Heart sounds: Normal heart sounds. No murmur heard.     Comments: 1+ DP and PT pulse in the right  No pulse palpable left foot but does appear warm and well-perfused  Pulmonary:      Effort: Pulmonary effort is normal. No respiratory distress.      Breath sounds: Normal breath sounds. No wheezing or rales.   Musculoskeletal:      Right lower leg: No edema.      Left lower leg: No edema.   Skin:     Coloration: Skin is not pale.   Neurological:      General: No focal deficit present.      Mental Status: He is alert and oriented to person, place, and time.      Cranial Nerves: No cranial nerve deficit.      Coordination: Coordination normal.      Gait: Gait is intact. Gait normal.   Psychiatric:         Mood and Affect: Mood and affect normal.         Behavior: Behavior normal.          DATA REVIEW    Lab Results   Component Value Date/Time    CHOLSTRLTOT 171 01/05/2024 10:29 AM    LDL 94 01/05/2024 10:29 AM    HDL 62 01/05/2024 10:29 AM    TRIGLYCERIDE 74 01/05/2024 10:29 AM       Lab Results   Component Value Date/Time    SODIUM 141 01/05/2024 10:29 AM    POTASSIUM 4.1 01/05/2024 10:29 AM    CHLORIDE 102 01/05/2024 10:29 AM    CO2 26 01/05/2024 10:29 AM    GLUCOSE 96 01/05/2024 10:29 AM    BUN 20 01/05/2024 10:29 AM    CREATININE 1.41 (H) 01/05/2024 10:29 AM     Lab Results   Component Value Date/Time    ALKPHOSPHAT 86 01/05/2024 10:29 AM    ASTSGOT 17 01/05/2024 10:29 AM    ALTSGPT 13 01/05/2024 10:29 AM    TBILIRUBIN 0.6 01/05/2024 10:29 AM       No results found for: \"HBA1C\"    No results found for: \"MICROALBCALC\", \"MALBCRT\", \"MALBEXCR\", \"MKILOT71\", \"MICROALBUR\", \"MICRALB\", \"UMICROALBUM\", \"MICROALBTIM\"      Cardiovascular Imaging:        Medical Decision Making:  Today's Assessment " / Status / Plan:     1. Abdominal aortic aneurysm (AAA) without rupture, unspecified part ([facility])  US-AORTA/ILIACS DUPLEX COMPLETE      2. History of coronary artery bypass surgery        3. Pure hypercholesterolemia  ezetimibe (ZETIA) 10 MG Tab    Comp Metabolic Panel    Lipid Profile    LIPOPROTEIN A      4. Primary hypertension  Comp Metabolic Panel    MICROALBUMIN CREAT RATIO URINE    CBC WITHOUT DIFFERENTIAL      5. Hypothyroidism, unspecified type  TSH    URINALYSIS      6. Stage 3a chronic kidney disease        7. SILVIO (obstructive sleep apnea)        8. Arteriosclerosis of coronary artery        9. Medication refill  metoprolol SR (TOPROL XL) 25 MG TABLET SR 24 HR    ramipril (ALTACE) 5 MG Cap      10. PAD (peripheral artery disease) ([facility])  US-EXTREMITY ARTERY LOWER BILAT W/ALEXANDRE (COMBO)         Etiology of Established CVD if Present:     1) CAD status post CABG 2012 -no recurrent events or interventions.  No angina.  Great exercise tolerance  -Continue medical management as per below    2) AAA status post EVAR approximately 2013 -no previous endoleak but did have acute thrombosis of left distal limb requiring femorofemoral bypass.  We discussed the importance of lifelong surveillance  -Check aortoiliac duplex  -Medical management lifestyle modifications per below    3) PAD -status post femorofemoral as above.  He does have some more distal atherosclerotic disease per previous imaging.  No claudication currently  -Continue medical management as per below  -Check bilateral lower extremity arterial duplex with attention to femorofemoral bypass    LIPID MANAGEMENT  Qualifies for Statin Therapy Based on 2018 ACC/AHA Guidelines: yes, Secondary prevention - <76yo, ASCVD not at very high risk  Major ASCVD events: CABG and EVAR as above  High-risk conditions: N/A  Risk-enhancers: None  Currently on Statin: Yes  Tx goals: LDL-C <70   At goal? no  Plan:   -Continue atorvastatin 40 mg daily  -Add ezetimibe 10 mg a  day  -Recheck fasting lipid panel lipoprotein a  -Intensify pharmacotherapy if needed based upon results    BLOOD PRESSURE MANAGEMENT  BP Goal ACC/AHA (2017) goal <130/80  Home BP at goal: No  Office BP at goal:  no  24h ABPM:  not ordered to date   RDN candidate? UNDECIDED  Contributing factors: ckd  Plan:   Monitoring:   - continue home BP monitoring, reviewed correct technique, provide BP log and instructions  -Recheck GFR, electrolytes, urine for albumin  Medications:  -Continue metoprolol 25 mg twice daily for now but could consider tapering off in the future in favor of a more effective antihypertensive medication  -Increase ramipril to 5 mg twice daily    GLYCEMIC MANAGEMENT Normal  -Recheck fasting glucose    ANTITHROMBOTIC THERAPY   -Continue aspirin 81 mg daily    LIFESTYLE INTERVENTIONS:    Tobacco Use: Quit many years ago  - continued complete avoidance of all tobacco products     Physical Activity: Continue excellent exercise routine    Weight Management and/or Nutrition: Maintain 30 pound weight loss.  Continue avoid processed foods    OTHER:     -CKD, stage IIIa -appears stable.  Blood pressure control and ACE inhibitor as above.  Avoid excess NSAIDs.  Recheck GFR, electrolytes, urine for albumin    -Hypothyroidism -appears stable based on symptoms and most recent TSH.  Continue current thyroxine as ordered by PCP.  Recheck TSH T4 with next blood work    -SILVIO -previously on CPAP but has discontinued as he feels his symptoms have resolved with weight loss.  Defer any further workup and management to PCP and/or sleep medicine    Studies to Be Obtained: Aortoiliac duplex and bilateral lower extremity arterial duplex with attention to femorofemoral bypass and ALEXANDRE prior to next visit  Labs to Be Obtained: As above prior to next visit    Follow up in: 3 months at [facility] location    Michael J Bloch, M.D.   [facility]  [facility]  [phone]    Cc: GEORGES Ty

## 2024-07-08 SDOH — HEALTH STABILITY: MENTAL HEALTH
STRESS IS WHEN SOMEONE FEELS TENSE, NERVOUS, ANXIOUS, OR CAN'T SLEEP AT NIGHT BECAUSE THEIR MIND IS TROUBLED. HOW STRESSED ARE YOU?: NOT AT ALL

## 2024-07-08 SDOH — ECONOMIC STABILITY: FOOD INSECURITY: WITHIN THE PAST 12 MONTHS, THE FOOD YOU BOUGHT JUST DIDN'T LAST AND YOU DIDN'T HAVE MONEY TO GET MORE.: NEVER TRUE

## 2024-07-08 SDOH — ECONOMIC STABILITY: FOOD INSECURITY: WITHIN THE PAST 12 MONTHS, YOU WORRIED THAT YOUR FOOD WOULD RUN OUT BEFORE YOU GOT MONEY TO BUY MORE.: NEVER TRUE

## 2024-07-08 SDOH — ECONOMIC STABILITY: HOUSING INSECURITY: IN THE LAST 12 MONTHS, HOW MANY PLACES HAVE YOU LIVED?: 1

## 2024-07-08 SDOH — HEALTH STABILITY: PHYSICAL HEALTH: ON AVERAGE, HOW MANY DAYS PER WEEK DO YOU ENGAGE IN MODERATE TO STRENUOUS EXERCISE (LIKE A BRISK WALK)?: 5 DAYS

## 2024-07-08 SDOH — ECONOMIC STABILITY: INCOME INSECURITY: IN THE LAST 12 MONTHS, WAS THERE A TIME WHEN YOU WERE NOT ABLE TO PAY THE MORTGAGE OR RENT ON TIME?: NO

## 2024-07-08 SDOH — ECONOMIC STABILITY: INCOME INSECURITY: HOW HARD IS IT FOR YOU TO PAY FOR THE VERY BASICS LIKE FOOD, HOUSING, MEDICAL CARE, AND HEATING?: NOT VERY HARD

## 2024-07-08 SDOH — HEALTH STABILITY: PHYSICAL HEALTH: ON AVERAGE, HOW MANY MINUTES DO YOU ENGAGE IN EXERCISE AT THIS LEVEL?: 50 MIN

## 2024-07-08 ASSESSMENT — LIFESTYLE VARIABLES
AUDIT-C TOTAL SCORE: 2
SKIP TO QUESTIONS 9-10: 1
HOW MANY STANDARD DRINKS CONTAINING ALCOHOL DO YOU HAVE ON A TYPICAL DAY: 1 OR 2
HOW OFTEN DO YOU HAVE A DRINK CONTAINING ALCOHOL: 2-4 TIMES A MONTH
HOW OFTEN DO YOU HAVE SIX OR MORE DRINKS ON ONE OCCASION: NEVER

## 2024-07-08 ASSESSMENT — SOCIAL DETERMINANTS OF HEALTH (SDOH)
HOW OFTEN DO YOU GET TOGETHER WITH FRIENDS OR RELATIVES?: THREE TIMES A WEEK
IN THE PAST 12 MONTHS, HAS THE ELECTRIC, GAS, OIL, OR WATER COMPANY THREATENED TO SHUT OFF SERVICE IN YOUR HOME?: NO
HOW OFTEN DO YOU ATTENT MEETINGS OF THE CLUB OR ORGANIZATION YOU BELONG TO?: 1 TO 4 TIMES PER YEAR
HOW OFTEN DO YOU ATTENT MEETINGS OF THE CLUB OR ORGANIZATION YOU BELONG TO?: 1 TO 4 TIMES PER YEAR
WITHIN THE PAST 12 MONTHS, YOU WORRIED THAT YOUR FOOD WOULD RUN OUT BEFORE YOU GOT THE MONEY TO BUY MORE: NEVER TRUE
IN A TYPICAL WEEK, HOW MANY TIMES DO YOU TALK ON THE PHONE WITH FAMILY, FRIENDS, OR NEIGHBORS?: MORE THAN THREE TIMES A WEEK
DO YOU BELONG TO ANY CLUBS OR ORGANIZATIONS SUCH AS CHURCH GROUPS UNIONS, FRATERNAL OR ATHLETIC GROUPS, OR SCHOOL GROUPS?: NO
HOW HARD IS IT FOR YOU TO PAY FOR THE VERY BASICS LIKE FOOD, HOUSING, MEDICAL CARE, AND HEATING?: NOT VERY HARD
HOW OFTEN DO YOU HAVE A DRINK CONTAINING ALCOHOL: 2-4 TIMES A MONTH
DO YOU BELONG TO ANY CLUBS OR ORGANIZATIONS SUCH AS CHURCH GROUPS UNIONS, FRATERNAL OR ATHLETIC GROUPS, OR SCHOOL GROUPS?: NO
HOW OFTEN DO YOU ATTEND CHURCH OR RELIGIOUS SERVICES?: 1 TO 4 TIMES PER YEAR
HOW OFTEN DO YOU GET TOGETHER WITH FRIENDS OR RELATIVES?: THREE TIMES A WEEK
HOW OFTEN DO YOU ATTEND CHURCH OR RELIGIOUS SERVICES?: 1 TO 4 TIMES PER YEAR
HOW OFTEN DO YOU HAVE SIX OR MORE DRINKS ON ONE OCCASION: NEVER
IN A TYPICAL WEEK, HOW MANY TIMES DO YOU TALK ON THE PHONE WITH FAMILY, FRIENDS, OR NEIGHBORS?: MORE THAN THREE TIMES A WEEK
HOW MANY DRINKS CONTAINING ALCOHOL DO YOU HAVE ON A TYPICAL DAY WHEN YOU ARE DRINKING: 1 OR 2

## 2024-07-11 ENCOUNTER — OFFICE VISIT (OUTPATIENT)
Dept: MEDICAL GROUP | Facility: MEDICAL CENTER | Age: 74
End: 2024-07-11
Payer: MEDICARE

## 2024-07-11 VITALS
HEART RATE: 78 BPM | DIASTOLIC BLOOD PRESSURE: 78 MMHG | WEIGHT: 159 LBS | TEMPERATURE: 97 F | BODY MASS INDEX: 23.55 KG/M2 | HEIGHT: 69 IN | RESPIRATION RATE: 18 BRPM | SYSTOLIC BLOOD PRESSURE: 140 MMHG | OXYGEN SATURATION: 97 %

## 2024-07-11 DIAGNOSIS — Z00.00 ENCOUNTER FOR ANNUAL WELLNESS VISIT (AWV) IN MEDICARE PATIENT: ICD-10-CM

## 2024-07-11 DIAGNOSIS — Z53.20 LUNG CANCER SCREENING DECLINED BY PATIENT: ICD-10-CM

## 2024-07-11 PROCEDURE — G0439 PPPS, SUBSEQ VISIT: HCPCS | Performed by: STUDENT IN AN ORGANIZED HEALTH CARE EDUCATION/TRAINING PROGRAM

## 2024-07-11 ASSESSMENT — ENCOUNTER SYMPTOMS: GENERAL WELL-BEING: GOOD

## 2024-07-11 ASSESSMENT — PATIENT HEALTH QUESTIONNAIRE - PHQ9: CLINICAL INTERPRETATION OF PHQ2 SCORE: 0

## 2024-07-11 ASSESSMENT — ACTIVITIES OF DAILY LIVING (ADL): BATHING_REQUIRES_ASSISTANCE: 0

## 2024-08-02 ENCOUNTER — TELEPHONE (OUTPATIENT)
Dept: VASCULAR LAB | Facility: MEDICAL CENTER | Age: 74
End: 2024-08-02
Payer: MEDICARE

## 2024-08-02 NOTE — TELEPHONE ENCOUNTER
Called and left message to change appt at 11am on 08/08 with Dr Bloch.    We have openings same day at 840AM.     Please forward call to Kerry Ho, Med Ass't    Kerry Ho, Med Ass't  [facility]  Ph. [phone]  Fx. [phone]

## 2024-08-05 ENCOUNTER — TELEPHONE (OUTPATIENT)
Dept: VASCULAR LAB | Facility: MEDICAL CENTER | Age: 74
End: 2024-08-05
Payer: MEDICARE

## 2024-08-05 NOTE — TELEPHONE ENCOUNTER
Established patient  Chart prep for upcoming appointment.    Any pending/incomplete orders from last visit? Yes Labs  Was patient called and reminded to complete pending orders? Yes  Were any records requested?  No    Referral up to date? Yes  Referral attached to appointment (renewals and New patients only)? N/A (established with up-to-date referral)  Virtual appointment? No    Kerry Ho, Med Ass't  [facility]  Ph. [phone]  Fx. [phone]

## 2024-08-07 ENCOUNTER — HOSPITAL ENCOUNTER (OUTPATIENT)
Dept: RADIOLOGY | Facility: MEDICAL CENTER | Age: 74
End: 2024-08-07
Attending: INTERNAL MEDICINE
Payer: MEDICARE

## 2024-08-07 ENCOUNTER — DOCUMENTATION (OUTPATIENT)
Dept: VASCULAR LAB | Facility: MEDICAL CENTER | Age: 74
End: 2024-08-07

## 2024-08-07 ENCOUNTER — HOSPITAL ENCOUNTER (OUTPATIENT)
Dept: LAB | Facility: MEDICAL CENTER | Age: 74
End: 2024-08-07
Attending: INTERNAL MEDICINE
Payer: MEDICARE

## 2024-08-07 DIAGNOSIS — E03.9 HYPOTHYROIDISM, UNSPECIFIED TYPE: ICD-10-CM

## 2024-08-07 DIAGNOSIS — I71.40 ABDOMINAL AORTIC ANEURYSM (AAA) WITHOUT RUPTURE, UNSPECIFIED PART (HCC): ICD-10-CM

## 2024-08-07 DIAGNOSIS — I10 PRIMARY HYPERTENSION: ICD-10-CM

## 2024-08-07 DIAGNOSIS — E78.00 PURE HYPERCHOLESTEROLEMIA: ICD-10-CM

## 2024-08-07 DIAGNOSIS — I73.9 PAD (PERIPHERAL ARTERY DISEASE) (HCC): ICD-10-CM

## 2024-08-07 LAB
ALBUMIN SERPL BCP-MCNC: 4.1 G/DL (ref 3.2–4.9)
ALBUMIN/GLOB SERPL: 1.3 G/DL
ALP SERPL-CCNC: 93 U/L (ref 30–99)
ALT SERPL-CCNC: 16 U/L (ref 2–50)
ANION GAP SERPL CALC-SCNC: 13 MMOL/L (ref 7–16)
APPEARANCE UR: CLEAR
AST SERPL-CCNC: 19 U/L (ref 12–45)
BILIRUB SERPL-MCNC: 0.4 MG/DL (ref 0.1–1.5)
BILIRUB UR QL STRIP.AUTO: NEGATIVE
BUN SERPL-MCNC: 25 MG/DL (ref 8–22)
CALCIUM ALBUM COR SERPL-MCNC: 8.9 MG/DL (ref 8.5–10.5)
CALCIUM SERPL-MCNC: 9 MG/DL (ref 8.4–10.2)
CHLORIDE SERPL-SCNC: 101 MMOL/L (ref 96–112)
CHOLEST SERPL-MCNC: 197 MG/DL (ref 100–199)
CO2 SERPL-SCNC: 24 MMOL/L (ref 20–33)
COLOR UR: YELLOW
CREAT SERPL-MCNC: 1.62 MG/DL (ref 0.5–1.4)
CREAT UR-MCNC: 84.68 MG/DL
ERYTHROCYTE [DISTWIDTH] IN BLOOD BY AUTOMATED COUNT: 46.5 FL (ref 35.9–50)
FASTING STATUS PATIENT QL REPORTED: NORMAL
GFR SERPLBLD CREATININE-BSD FMLA CKD-EPI: 44 ML/MIN/1.73 M 2
GLOBULIN SER CALC-MCNC: 3.2 G/DL (ref 1.9–3.5)
GLUCOSE SERPL-MCNC: 111 MG/DL (ref 65–99)
GLUCOSE UR STRIP.AUTO-MCNC: NEGATIVE MG/DL
HCT VFR BLD AUTO: 41.1 % (ref 42–52)
HDLC SERPL-MCNC: 67 MG/DL
HGB BLD-MCNC: 13.6 G/DL (ref 14–18)
KETONES UR STRIP.AUTO-MCNC: NEGATIVE MG/DL
LDLC SERPL CALC-MCNC: 119 MG/DL
LEUKOCYTE ESTERASE UR QL STRIP.AUTO: NEGATIVE
MCH RBC QN AUTO: 32.4 PG (ref 27–33)
MCHC RBC AUTO-ENTMCNC: 33.1 G/DL (ref 32.3–36.5)
MCV RBC AUTO: 97.9 FL (ref 81.4–97.8)
MICRO URNS: NORMAL
MICROALBUMIN UR-MCNC: 1.5 MG/DL
MICROALBUMIN/CREAT UR: 18 MG/G (ref 0–30)
NITRITE UR QL STRIP.AUTO: NEGATIVE
PH UR STRIP.AUTO: 6 [PH] (ref 5–8)
PLATELET # BLD AUTO: 191 K/UL (ref 164–446)
PMV BLD AUTO: 10.8 FL (ref 9–12.9)
POTASSIUM SERPL-SCNC: 4.3 MMOL/L (ref 3.6–5.5)
PROT SERPL-MCNC: 7.3 G/DL (ref 6–8.2)
PROT UR QL STRIP: NEGATIVE MG/DL
RBC # BLD AUTO: 4.2 M/UL (ref 4.7–6.1)
RBC UR QL AUTO: NEGATIVE
SODIUM SERPL-SCNC: 138 MMOL/L (ref 135–145)
SP GR UR STRIP.AUTO: 1.01
TRIGL SERPL-MCNC: 56 MG/DL (ref 0–149)
TSH SERPL DL<=0.005 MIU/L-ACNC: 2 UIU/ML (ref 0.38–5.33)
WBC # BLD AUTO: 7.7 K/UL (ref 4.8–10.8)

## 2024-08-07 PROCEDURE — 80053 COMPREHEN METABOLIC PANEL: CPT

## 2024-08-07 PROCEDURE — 93922 UPR/L XTREMITY ART 2 LEVELS: CPT | Mod: 26 | Performed by: INTERNAL MEDICINE

## 2024-08-07 PROCEDURE — 93925 LOWER EXTREMITY STUDY: CPT | Mod: 26 | Performed by: INTERNAL MEDICINE

## 2024-08-07 PROCEDURE — 81003 URINALYSIS AUTO W/O SCOPE: CPT

## 2024-08-07 PROCEDURE — 93922 UPR/L XTREMITY ART 2 LEVELS: CPT

## 2024-08-07 PROCEDURE — 80061 LIPID PANEL: CPT

## 2024-08-07 PROCEDURE — 84443 ASSAY THYROID STIM HORMONE: CPT

## 2024-08-07 PROCEDURE — 93978 VASCULAR STUDY: CPT

## 2024-08-07 PROCEDURE — 93925 LOWER EXTREMITY STUDY: CPT

## 2024-08-07 PROCEDURE — 93978 VASCULAR STUDY: CPT | Mod: 26 | Performed by: INTERNAL MEDICINE

## 2024-08-07 PROCEDURE — 36415 COLL VENOUS BLD VENIPUNCTURE: CPT

## 2024-08-07 PROCEDURE — 82570 ASSAY OF URINE CREATININE: CPT

## 2024-08-07 PROCEDURE — 85027 COMPLETE CBC AUTOMATED: CPT

## 2024-08-07 PROCEDURE — 82043 UR ALBUMIN QUANTITATIVE: CPT

## 2024-08-07 PROCEDURE — 83695 ASSAY OF LIPOPROTEIN(A): CPT

## 2024-08-07 NOTE — Clinical Note
Kentrell aortoiliac and JL arterial as done Will have more recs for you after he sees vascular surg. I'll be talking to him in office tomorrow

## 2024-08-07 NOTE — PROGRESS NOTES
Non-invasive Imaging reviewed.   EVAR stable without endoleak  Persistent occluded left limb  Right to left fem-fem bypass with stenosis at distal anastomosis. 32 vessel run off on left but monophasic flow and ALEXANDRE reduced to 0.60    Will d/w patient at f/u visit tomorrow, but likely refer back to vascular surgery for further evaluation.    Michael Bloch, MD  [facility]

## 2024-08-08 ENCOUNTER — APPOINTMENT (OUTPATIENT)
Dept: CARDIOLOGY | Facility: MEDICAL CENTER | Age: 74
End: 2024-08-08
Attending: INTERNAL MEDICINE
Payer: MEDICARE

## 2024-08-08 VITALS
HEIGHT: 69 IN | WEIGHT: 158 LBS | DIASTOLIC BLOOD PRESSURE: 69 MMHG | SYSTOLIC BLOOD PRESSURE: 165 MMHG | BODY MASS INDEX: 23.4 KG/M2 | HEART RATE: 84 BPM

## 2024-08-08 DIAGNOSIS — I71.40 ABDOMINAL AORTIC ANEURYSM (AAA) WITHOUT RUPTURE, UNSPECIFIED PART (HCC): ICD-10-CM

## 2024-08-08 DIAGNOSIS — Z95.828 HISTORY OF ARTERIAL BYPASS OF LOWER EXTREMITY: ICD-10-CM

## 2024-08-08 DIAGNOSIS — N18.31 STAGE 3A CHRONIC KIDNEY DISEASE: ICD-10-CM

## 2024-08-08 DIAGNOSIS — E03.9 HYPOTHYROIDISM, UNSPECIFIED TYPE: ICD-10-CM

## 2024-08-08 DIAGNOSIS — I10 PRIMARY HYPERTENSION: ICD-10-CM

## 2024-08-08 DIAGNOSIS — Z76.0 MEDICATION REFILL: ICD-10-CM

## 2024-08-08 DIAGNOSIS — Z95.1 HISTORY OF CORONARY ARTERY BYPASS SURGERY: ICD-10-CM

## 2024-08-08 DIAGNOSIS — E78.00 PURE HYPERCHOLESTEROLEMIA: ICD-10-CM

## 2024-08-08 PROCEDURE — 99214 OFFICE O/P EST MOD 30 MIN: CPT | Performed by: INTERNAL MEDICINE

## 2024-08-08 PROCEDURE — G2211 COMPLEX E/M VISIT ADD ON: HCPCS | Performed by: INTERNAL MEDICINE

## 2024-08-08 PROCEDURE — 3078F DIAST BP <80 MM HG: CPT | Performed by: INTERNAL MEDICINE

## 2024-08-08 PROCEDURE — 3077F SYST BP >= 140 MM HG: CPT | Performed by: INTERNAL MEDICINE

## 2024-08-08 PROCEDURE — 99212 OFFICE O/P EST SF 10 MIN: CPT

## 2024-08-08 RX ORDER — METOPROLOL SUCCINATE 25 MG/1
25 TABLET, EXTENDED RELEASE ORAL DAILY
Qty: 30 TABLET | Refills: 11 | Status: SHIPPED
Start: 2024-08-08

## 2024-08-08 RX ORDER — DOXAZOSIN 2 MG/1
1 TABLET ORAL DAILY
Qty: 90 TABLET | Refills: 3 | Status: SHIPPED | OUTPATIENT
Start: 2024-08-08

## 2024-08-08 ASSESSMENT — FIBROSIS 4 INDEX: FIB4 SCORE: 1.87

## 2024-08-08 NOTE — PATIENT INSTRUCTIONS
We will refer you to vascular surgery for their opinion - they will call you    Keep up the great diet and exercise habits    Start checking bp at home and keep a log    CHOLESTEROL  - continue ATORVASTATIN every day  - continue EZETIMIBE every day     BP  - continue diltiazem  - continue ramipril 2x daily  - decrease metoprolol to 1 tab once daily  - add doxazosin 1/2 pill each night    Blood Thinner   - continue aspirin    Fasting blood work before returns    Follow up     Michael Bloch, MD  [facility]  [phone]    BLOOD PRESSURE

## 2024-08-08 NOTE — PROGRESS NOTES
"[facility] - Follow up VISIT  08/07/24    Maxwell Salter is a 74 y.o. male   Referring provider: Lenora Ty M.D.     Subjective      HPI:   Here for f/u of AAA and PAD s/p EVAR and fem-fem bypass in setting of CAD s/p CABG, htn, and dyslipidemia with stage 3a ckd    CAD  1990s coronary pci  2014 had cabg  No chest pain  [place]    AAA/PAD  EVAR in 2012  Had fem-fem 2014 for acute failure of distal limb  No recurrent procedures  No claudication  Does feel that his legs are weaker than they should be -he uses the term atrophy    Dyslipidemia  Has been on atorvastatin for a long time - good adherence  Started ezetimibe last visit  No myalgias    BP/ckd  Has tapered down metoprolol - not on 25 bid  Remains on low dose of ramipril - has never taken a higher dose   he still takes diltiazem  He is not on a diuretic  Stable stage 3a dz  Denies excess nsaids  He has not been measuring blood pressure at home    SILVIO  Previously on cpap - not currently  Denies symptoms since losing weight      Social History     Tobacco Use    Smoking status: Former     Current packs/day: 0.00     Average packs/day: 1 pack/day for 43.7 years (43.7 ttl pk-yrs)     Types: Cigarettes     Start date: 9/1/1970     Quit date: 6/1/2014     Years since quitting: 10.1    Smokeless tobacco: Never   Vaping Use    Vaping status: Never Used   Substance Use Topics    Alcohol use: Yes     Alcohol/week: 1.2 oz     Types: 2 Cans of beer per week    Drug use: Never     DIET AND EXERCISE:  Weight Change:down 30 pounds since moving to [place]  Diet: relatively low fat - whole foods  Exercise: avidly           Objective    Vitals:    08/08/24 0840 08/08/24 0843   BP: (!) 167/81 (!) 165/69   BP Location: Left arm Left arm   Patient Position: Sitting Sitting   BP Cuff Size: Adult Adult   Pulse: 83 84   Weight: 71.7 kg (158 lb)    Height: 1.753 m (5' 9\")         BP Readings from Last 4 Encounters:   08/08/24 (!) 165/69   07/11/24 " "(!) 140/78   05/06/24 (!) 156/75   01/11/24 110/60      Body mass index is 23.33 kg/m².   Wt Readings from Last 4 Encounters:   08/08/24 71.7 kg (158 lb)   07/11/24 72.1 kg (159 lb)   05/06/24 71.7 kg (158 lb)   01/11/24 71.8 kg (158 lb 3.2 oz)      Physical Exam  Vitals reviewed.   Constitutional:       General: He is not in acute distress.     Appearance: He is not diaphoretic.   HENT:      Head: Normocephalic and atraumatic.   Eyes:      General: No scleral icterus.     Conjunctiva/sclera: Conjunctivae normal.   Neck:      Vascular: No carotid bruit.   Cardiovascular:      Rate and Rhythm: Normal rate and regular rhythm.      Heart sounds: Normal heart sounds. No murmur heard.     Comments: 1+ DP and PT pulse in the right  No pulse palpable left foot but does appear warm and well-perfused  Pulmonary:      Effort: Pulmonary effort is normal. No respiratory distress.      Breath sounds: Normal breath sounds. No wheezing or rales.   Musculoskeletal:      Right lower leg: No edema.      Left lower leg: No edema.   Skin:     Coloration: Skin is not pale.   Neurological:      General: No focal deficit present.      Mental Status: He is alert and oriented to person, place, and time.      Cranial Nerves: No cranial nerve deficit.      Coordination: Coordination normal.      Gait: Gait is intact. Gait normal.   Psychiatric:         Mood and Affect: Mood and affect normal.         Behavior: Behavior normal.          DATA REVIEW    Lab Results   Component Value Date/Time    CHOLSTRLTOT 197 08/07/2024 08:20 AM     (H) 08/07/2024 08:20 AM    HDL 67 08/07/2024 08:20 AM    TRIGLYCERIDE 56 08/07/2024 08:20 AM       No results found for: \"LIPOPROTA\"   No results found for: \"APOB\"   No results found for: \"CRPHIGHSEN\"     Lab Results   Component Value Date/Time    SODIUM 138 08/07/2024 08:20 AM    POTASSIUM 4.3 08/07/2024 08:20 AM    CHLORIDE 101 08/07/2024 08:20 AM    CO2 24 08/07/2024 08:20 AM    GLUCOSE 111 (H) 08/07/2024 " "08:20 AM    BUN 25 (H) 08/07/2024 08:20 AM    CREATININE 1.62 (H) 08/07/2024 08:20 AM     Lab Results   Component Value Date/Time    ALKPHOSPHAT 93 08/07/2024 08:20 AM    ASTSGOT 19 08/07/2024 08:20 AM    ALTSGPT 16 08/07/2024 08:20 AM    TBILIRUBIN 0.4 08/07/2024 08:20 AM       No results found for: \"HBA1C\"    Lab Results   Component Value Date/Time    MALBCRT 18 08/07/2024 07:16 AM    MICROALBUR 1.5 08/07/2024 07:16 AM         Cardiovascular Imaging:    Aortoiliac duplex aug 2024  Evar without endoleak  Occluded left limb  Right limb patent    LE arterial aug 2024  Stenosis at distal anastomosis of left-right fem-fem  High grade stenosis right SFA  Occluded left SFA  3 vessel runoff bilat  Biphasic distal flow on left  Monophasic distal flow on right          Medical Decision Making:  Today's Assessment / Status / Plan:     1. Stage 3a chronic kidney disease  Comp Metabolic Panel      2. Hypothyroidism, unspecified type        3. Primary hypertension  doxazosin (CARDURA) 2 MG Tab      4. Pure hypercholesterolemia  Lipid Profile    LIPOPROTEIN A    APOLIPOPROTEIN B      5. History of coronary artery bypass surgery        6. History of arterial bypass of lower extremity        7. Abdominal aortic aneurysm (AAA) without rupture, unspecified part (HCC)        8. Medication refill  metoprolol SR (TOPROL XL) 25 MG TABLET SR 24 HR         Etiology of Established CVD if Present:     1) CAD status post CABG 2012 -no recurrent events or interventions.  No angina.  Great exercise tolerance  -Continue medical management as per below    2) AAA status post EVAR approximately 2013 -no previous endoleak but did have acute thrombosis of left distal limb requiring femorofemoral bypass.  We discussed the importance of lifelong surveillance. No endoleak on duplex  -Medical management lifestyle modifications per below  -timing of surveillance imaging TBD    3) PAD -status post femorofemoral as above.  He does have some more distal " atherosclerotic disease per previous imaging.  He does not have classic claudication but does say both his legs are weaker than he thinks he should be for the level of exercise.  He uses the term atrophy  U/s notable for stenosis of distal anastomosis with decreased flow distal  -Continue medical management as per below  -Referral to vascular surgery further recommendations  -timing of surveillance imaging TBD    LIPID MANAGEMENT  Qualifies for Statin Therapy Based on 2018 ACC/AHA Guidelines: yes, Secondary prevention - <76yo, ASCVD not at very high risk  Major ASCVD events: CABG and EVAR as above  High-risk conditions: N/A  Risk-enhancers: None  Currently on Statin: Yes  Tx goals: LDL-C <70 and apolipoprotein B less than 70  At goal? No  His LDL went up despite the addition of ezetimibe.  This is most likely due to adherence although patient denies missing pills.  Plan:   -Continue atorvastatin 40 mg daily -make sure to take every day  -Continue ezetimibe 10 mg a day  -Recheck fasting lipid panel, lipoprotein a, and apolipoprotein B  -Intensify pharmacotherapy if needed based upon results    BLOOD PRESSURE MANAGEMENT  BP Goal ACC/AHA (2017) goal <130/80  Home BP at goal: No  Office BP at goal:  no  24h ABPM:  not ordered to date   RDN candidate? UNDECIDED  Contributing factors: ckd  No albuminuria 2024  Plan:   Monitoring:   - continue home BP monitoring, reviewed correct technique, provide BP log and instructions  Medications:  -Continue diltiazem to 40 daily  -Increase ramipril to 5 mg twice daily  -Decrease metoprolol to 25 mg once daily and hopefully taper off at next visit  -Start doxazosin 1 mg each night and can increase dose as needed/tolerated    GLYCEMIC MANAGEMENT Normal  -Recheck fasting glucose    ANTITHROMBOTIC THERAPY   -Continue aspirin 81 mg daily    LIFESTYLE INTERVENTIONS:    Tobacco Use: Quit many years ago  - continued complete avoidance of all tobacco products     Physical Activity: Continue  excellent exercise routine, particularly his daily walking    Weight Management and/or Nutrition: Maintain 30 pound weight loss.  Continue avoid processed foods    OTHER:     -CKD, stage IIIa -appears stable.  Blood pressure control and ACE inhibitor as above.  Avoid excess NSAIDs.      -Hypothyroidism -appears stable based on symptoms and most recent TSH.  Continue current thyroxine as ordered by PCP.     -SILVIO -previously on CPAP but has discontinued as he feels his symptoms have resolved with weight loss.  Defer any further workup and management to PCP and/or sleep medicine    - anemia, mild - may be due to ckd but other etiologies certainly in differential. Defer further w/u and management to pcp    Studies to Be Obtained: TBD after vascular surgery consultation  Labs to Be Obtained: As above prior to next visit    Follow up in: 3 months at [facility] location    Michael J Bloch, M.D.   [facility]  [facility]  [phone]    Cc: GEORGES Ty

## 2024-08-09 DIAGNOSIS — Z76.0 MEDICATION REFILL: ICD-10-CM

## 2024-08-09 RX ORDER — ATORVASTATIN CALCIUM 40 MG/1
40 TABLET, FILM COATED ORAL NIGHTLY
Qty: 90 TABLET | Refills: 3 | Status: SHIPPED | OUTPATIENT
Start: 2024-08-09

## 2024-08-11 LAB — LPA SERPL-MCNC: 79 MG/DL

## 2024-09-10 ENCOUNTER — OFFICE VISIT (OUTPATIENT)
Dept: VASCULAR SURGERY | Facility: MEDICAL CENTER | Age: 74
End: 2024-09-10
Payer: MEDICARE

## 2024-09-10 VITALS
DIASTOLIC BLOOD PRESSURE: 60 MMHG | TEMPERATURE: 98.7 F | SYSTOLIC BLOOD PRESSURE: 122 MMHG | WEIGHT: 158 LBS | BODY MASS INDEX: 23.4 KG/M2 | HEART RATE: 90 BPM | OXYGEN SATURATION: 98 % | HEIGHT: 69 IN

## 2024-09-10 DIAGNOSIS — I77.9 PAOD (PERIPHERAL ARTERIAL OCCLUSIVE DISEASE) (HCC): ICD-10-CM

## 2024-09-10 DIAGNOSIS — I71.43 INFRARENAL ABDOMINAL AORTIC ANEURYSM (AAA) WITHOUT RUPTURE (HCC): ICD-10-CM

## 2024-09-10 PROCEDURE — 3074F SYST BP LT 130 MM HG: CPT | Performed by: SURGERY

## 2024-09-10 PROCEDURE — 99204 OFFICE O/P NEW MOD 45 MIN: CPT | Performed by: SURGERY

## 2024-09-10 PROCEDURE — 3078F DIAST BP <80 MM HG: CPT | Performed by: SURGERY

## 2024-09-10 ASSESSMENT — FIBROSIS 4 INDEX: FIB4 SCORE: 1.87

## 2024-09-10 NOTE — H&P
Vascular Surgery            New Patient Consultation    Patient:Maxwell Salter  MRN:[id number]  Primary care physician:Lenora Ty M.D.  Referring Provider: Michael Bloch, MD    Vascular Consultant: Lonnie Landis MD    Date: 9/10/2024  _____________________________________________________    Chief Complaint:     Chief Complaint   Patient presents with    New Patient     History of arterial bypass of lower extremity         History of Present Illness:   Maxwell Salter  is a 74 y.o. year old male who has a history of a previous aortic endograft repair many years ago.  Patient reports that approximately 3 to 4 years after repair of his aortic aneurysm with an endograft he required a right to left femoral to femoral artery bypass.  The patient has been in no surveillance and [place]  He was evaluated by Dr. Eid and there is some concern based on noninvasive arterial studies that there may be a stenosis within the femoral to femoral anastomosis.  The patient is reporting primarily numbness of the left leg.  He denies symptoms consistent with claudication but does report fairly profound numbness.  See discussion below    Past Medical History:     Past Medical History:   Diagnosis Date    Heart attack (HCC)     Hyperlipidemia     Hypertension     Kidney disease     Thyroid disease      Past Surgical History:     Past Surgical History:   Procedure Laterality Date    CORONARY ARTERY BYPASS, 3  2014     Allergies:   No Known Allergies  Medications:     Outpatient Encounter Medications as of 9/10/2024   Medication Sig Dispense Refill    atorvastatin (LIPITOR) 40 MG Tab Take 1 Tablet by mouth every evening. 90 Tablet 3    metoprolol SR (TOPROL XL) 25 MG TABLET SR 24 HR Take 1 Tablet by mouth every day. 30 Tablet 11    doxazosin (CARDURA) 2 MG Tab Take 0.5 Tablets by mouth every day. 90 Tablet 3    ramipril (ALTACE) 5 MG Cap Take 1 Capsule by mouth 2 times a  day. 180 Capsule 3    ezetimibe (ZETIA) 10 MG Tab Take 1 Tablet by mouth every day. 90 Tablet 3    levothyroxine (SYNTHROID) 150 MCG Tab TAKE 1 TABLET EVERY MORNINGON AN EMPTY STOMACH 90 Tablet 3    tadalafil (CIALIS) 20 MG tablet TAKE 1 TABLET BY MOUTH AS NEEDED FOR ERECTILE DYSFUNCTION 10 Tablet 3    dilTIAZem CD (CARDIZEM CD) 240 MG CAPSULE SR 24 HR TAKE 1 CAPSULE DAILY 90 Capsule 3    omeprazole (PRILOSEC) 40 MG delayed-release capsule TAKE 1 CAPSULE DAILY 90 Capsule 3    Aspirin 81 MG Cap Take 81 mg by mouth every day. 90 Capsule 3     No facility-administered encounter medications on file as of 9/10/2024.     Social History:     Social History     Socioeconomic History    Marital status: Single     Spouse name: Not on file    Number of children: Not on file    Years of education: Not on file    Highest education level: Bachelor's degree (e.g., BA, AB, BS)   Occupational History    Not on file   Tobacco Use    Smoking status: Former     Current packs/day: 0.00     Average packs/day: 1 pack/day for 43.7 years (43.7 ttl pk-yrs)     Types: Cigarettes     Start date: 9/1/1970     Quit date: 6/1/2014     Years since quitting: 10.2    Smokeless tobacco: Never   Vaping Use    Vaping status: Never Used   Substance and Sexual Activity    Alcohol use: Yes     Alcohol/week: 1.2 oz     Types: 2 Cans of beer per week    Drug use: Never    Sexual activity: Not Currently     Partners: Female   Other Topics Concern    Not on file   Social History Narrative    Not on file     Social Determinants of Health     Financial Resource Strain: Low Risk  (7/8/2024)    Overall Financial Resource Strain (CARDIA)     Difficulty of Paying Living Expenses: Not very hard   Food Insecurity: No Food Insecurity (7/8/2024)    Hunger Vital Sign     Worried About Running Out of Food in the Last Year: Never true     Ran Out of Food in the Last Year: Never true   Transportation Needs: No Transportation Needs (7/8/2024)    PRAPARE - Transportation      Lack of Transportation (Medical): No     Lack of Transportation (Non-Medical): No   Physical Activity: Sufficiently Active (7/8/2024)    Exercise Vital Sign     Days of Exercise per Week: 5 days     Minutes of Exercise per Session: 50 min   Stress: No Stress Concern Present (7/8/2024)    [employer] - Occupational Stress Questionnaire     Feeling of Stress : Not at all   Social Connections: Moderately Integrated (7/8/2024)    Social Connection and Isolation Panel [NHANES]     Frequency of Communication with Friends and Family: More than three times a week     Frequency of Social Gatherings with Friends and Family: Three times a week     Attends Hoahaoism Services: 1 to 4 times per year     Active Member of Clubs or Organizations: No     Attends Club or Organization Meetings: 1 to 4 times per year     Marital Status:    Intimate Partner Violence: Not on file   Housing Stability: Low Risk  (7/8/2024)    Housing Stability Vital Sign     Unable to Pay for Housing in the Last Year: No     Number of Places Lived in the Last Year: 1     Unstable Housing in the Last Year: No      Social History     Tobacco Use   Smoking Status Former    Current packs/day: 0.00    Average packs/day: 1 pack/day for 43.7 years (43.7 ttl pk-yrs)    Types: Cigarettes    Start date: 9/1/1970    Quit date: 6/1/2014    Years since quitting: 10.2   Smokeless Tobacco Never     Social History     Substance and Sexual Activity   Alcohol Use Yes    Alcohol/week: 1.2 oz    Types: 2 Cans of beer per week     Social History     Substance and Sexual Activity   Drug Use Never      Family History:     Family History   Problem Relation Age of Onset    Heart Disease Brother        Review of Systems:   Constitutional:   Reports -patient reports numbness left foot but also reports some numbness on the right side.    Denies Chest pain   Denies SOB   Denies abdominal pain   Denies focal neuro deficits      Exam:   /60 (BP  "Location: Left arm, Patient Position: Sitting, BP Cuff Size: Adult)   Pulse 90   Temp 37.1 °C (98.7 °F) (Temporal)   Ht 1.753 m (5' 9\")   Wt 71.7 kg (158 lb)   SpO2 98%   BMI 23.33 kg/m²     Constitutional: Alert, oriented, no acute distress  HEENT:  Normocephalic and atraumatic, EOMI  Neck:   Supple, no JVD,   Cardiovascular: Regular rate and rhythm,   Pulmonary:  Good air entry bilaterally,    Abdominal:  Soft, non-tender, non-distended  Musculoskeletal: No tenderness, no deformity  Neurological:  grossly intact, no focal deficits  Skin:   Skin is warm and dry. No rash noted.  Vascular exam: Upper extremities warm and adequately perfused, no edema     Lower extremities warm and adequately perfused, no edema           Imaging:   Noninvasive arterial studies 8/7/2024  Ankle-brachial index left side 0.6 right side 0.8     Vascular Laboratory   CONCLUSIONS   > 75% stenosis in the right distal superficial femoral artery. Three vessel    runoff to the ankle with bipashic flow.       A bypass graft is present from the right common femoral artery to the left    common femoral artery. 50-99% stenosis at the distal anastomosis. Occlusion    of the mid superficial femoral artery with distal collateral flow. Three    vessel runoff to the ankle with monophasic flow.      Assessment and Plan:   -Abdominal aortic aneurysm status post endovascular repair-patient has been out of surveillance for some time but a CTA will be ordered to both evaluate the aneurysm and repair as well as the patient's inflow    Peripheral arterial disease= status post right to left femoral to femoral artery bypass with possible anastomotic stenosis resulting in diminished perfusion left leg    Based on the results of the noninvasive studies I have determined that a CTA with runoff would be appropriate to both evaluate the aortic endograft as well as the bypass and vascular disease.    Further recommendations to be based on the results of that CTA " patient will follow-up in a couple weeks.      _____________________________________________________  [facility]  [phone]  [address], [place] [address]

## 2024-09-26 ENCOUNTER — HOSPITAL ENCOUNTER (OUTPATIENT)
Dept: RADIOLOGY | Facility: MEDICAL CENTER | Age: 74
End: 2024-09-26
Attending: SURGERY
Payer: MEDICARE

## 2024-09-26 DIAGNOSIS — I71.43 INFRARENAL ABDOMINAL AORTIC ANEURYSM (AAA) WITHOUT RUPTURE (HCC): ICD-10-CM

## 2024-09-26 DIAGNOSIS — I77.9 PAOD (PERIPHERAL ARTERIAL OCCLUSIVE DISEASE) (HCC): ICD-10-CM

## 2024-09-26 PROCEDURE — 75635 CT ANGIO ABDOMINAL ARTERIES: CPT

## 2024-09-26 PROCEDURE — 700117 HCHG RX CONTRAST REV CODE 255: Performed by: SURGERY

## 2024-09-26 RX ADMIN — IOHEXOL 100 ML: 350 INJECTION, SOLUTION INTRAVENOUS at 09:30

## 2024-09-30 ENCOUNTER — TELEPHONE (OUTPATIENT)
Dept: VASCULAR SURGERY | Facility: MEDICAL CENTER | Age: 74
End: 2024-09-30
Payer: MEDICARE

## 2024-10-24 ENCOUNTER — TELEPHONE (OUTPATIENT)
Dept: VASCULAR SURGERY | Facility: MEDICAL CENTER | Age: 74
End: 2024-10-24

## 2024-10-24 ENCOUNTER — OFFICE VISIT (OUTPATIENT)
Dept: VASCULAR SURGERY | Facility: MEDICAL CENTER | Age: 74
End: 2024-10-24
Payer: MEDICARE

## 2024-10-24 VITALS
SYSTOLIC BLOOD PRESSURE: 124 MMHG | OXYGEN SATURATION: 97 % | DIASTOLIC BLOOD PRESSURE: 66 MMHG | HEART RATE: 87 BPM | BODY MASS INDEX: 23.4 KG/M2 | TEMPERATURE: 98.8 F | WEIGHT: 158 LBS | HEIGHT: 69 IN

## 2024-10-24 DIAGNOSIS — I77.9 PAOD (PERIPHERAL ARTERIAL OCCLUSIVE DISEASE) (HCC): ICD-10-CM

## 2024-10-24 DIAGNOSIS — I71.43 INFRARENAL ABDOMINAL AORTIC ANEURYSM (AAA) WITHOUT RUPTURE (HCC): ICD-10-CM

## 2024-10-24 PROCEDURE — 3078F DIAST BP <80 MM HG: CPT | Performed by: SURGERY

## 2024-10-24 PROCEDURE — 3074F SYST BP LT 130 MM HG: CPT | Performed by: SURGERY

## 2024-10-24 PROCEDURE — 99213 OFFICE O/P EST LOW 20 MIN: CPT | Performed by: SURGERY

## 2024-10-24 ASSESSMENT — FIBROSIS 4 INDEX: FIB4 SCORE: 1.87

## 2024-11-06 ENCOUNTER — APPOINTMENT (OUTPATIENT)
Dept: ADMISSIONS | Facility: MEDICAL CENTER | Age: 74
End: 2024-11-06
Attending: SURGERY
Payer: MEDICARE

## 2024-11-07 ENCOUNTER — OFFICE VISIT (OUTPATIENT)
Dept: CARDIOLOGY | Facility: MEDICAL CENTER | Age: 74
End: 2024-11-07
Attending: INTERNAL MEDICINE
Payer: MEDICARE

## 2024-11-07 VITALS
HEART RATE: 102 BPM | SYSTOLIC BLOOD PRESSURE: 142 MMHG | BODY MASS INDEX: 23.55 KG/M2 | HEIGHT: 69 IN | DIASTOLIC BLOOD PRESSURE: 76 MMHG | WEIGHT: 159 LBS

## 2024-11-07 DIAGNOSIS — I71.40 ABDOMINAL AORTIC ANEURYSM (AAA) WITHOUT RUPTURE, UNSPECIFIED PART (HCC): ICD-10-CM

## 2024-11-07 DIAGNOSIS — Z95.1 HISTORY OF CORONARY ARTERY BYPASS SURGERY: ICD-10-CM

## 2024-11-07 DIAGNOSIS — E78.00 PURE HYPERCHOLESTEROLEMIA: ICD-10-CM

## 2024-11-07 DIAGNOSIS — N18.31 STAGE 3A CHRONIC KIDNEY DISEASE: ICD-10-CM

## 2024-11-07 DIAGNOSIS — Z95.828 HISTORY OF ARTERIAL BYPASS OF LOWER EXTREMITY: ICD-10-CM

## 2024-11-07 DIAGNOSIS — E03.9 HYPOTHYROIDISM, UNSPECIFIED TYPE: ICD-10-CM

## 2024-11-07 DIAGNOSIS — I10 PRIMARY HYPERTENSION: ICD-10-CM

## 2024-11-07 PROCEDURE — 3077F SYST BP >= 140 MM HG: CPT | Performed by: INTERNAL MEDICINE

## 2024-11-07 PROCEDURE — 3078F DIAST BP <80 MM HG: CPT | Performed by: INTERNAL MEDICINE

## 2024-11-07 PROCEDURE — 99212 OFFICE O/P EST SF 10 MIN: CPT

## 2024-11-07 PROCEDURE — 99214 OFFICE O/P EST MOD 30 MIN: CPT | Performed by: INTERNAL MEDICINE

## 2024-11-07 PROCEDURE — G2211 COMPLEX E/M VISIT ADD ON: HCPCS | Performed by: INTERNAL MEDICINE

## 2024-11-07 ASSESSMENT — FIBROSIS 4 INDEX: FIB4 SCORE: 1.87

## 2024-11-07 NOTE — PROGRESS NOTES
VASCULAR MEDICINE CLINIC - Follow up VISIT  11/07/24    Maxwell Salter is a 74 y.o. male   Referring provider: Lenora Ty M.D.     Subjective      HPI:   Here for f/u of AAA and PAD s/p EVAR and fem-fem bypass in setting of CAD s/p CABG, htn, and dyslipidemia with stage 3a ckd    CAD  1990s coronary pci  2014 had cabg  No chest pain  [place]    AAA/PAD  EVAR in 2012  Had fem-fem 2014 for acute failure of distal limb  No recurrent procedures  No claudication  Does feel that his legs are weaker than they should be -he uses the term atrophy  Has had imaging per below  Saw Dr. Landis and is scheduled for revision of fem-fem bypass in early dec    Dyslipidemia  Has been on atorvastatin for a long time - good adherence  Started ezetimibe previously  No myalgias  Did not have blood drawn    BP/ckd  Has tapered down metoprolol - now on 25 mg daily  Now on increased dose of ramipril 5 bid  he still takes diltiazem 240 mg daily  Started on low dose doxazosin  He is not on a diuretic  Stable stage 3a dz  Denies excess nsaids  120s/60s generally - has been higher over past weeks in setting of URI    SILVIO  Previously on cpap - not currently  Denies symptoms since losing weight      Social History     Tobacco Use    Smoking status: Former     Current packs/day: 0.00     Average packs/day: 1 pack/day for 43.7 years (43.7 ttl pk-yrs)     Types: Cigarettes     Start date: 9/1/1970     Quit date: 6/1/2014     Years since quitting: 10.4    Smokeless tobacco: Never   Vaping Use    Vaping status: Never Used   Substance Use Topics    Alcohol use: Yes     Alcohol/week: 1.2 oz     Types: 2 Cans of beer per week    Drug use: Never     DIET AND EXERCISE:  Weight Change:down 30 pounds since moving to [place] - now stable  Diet: relatively low fat - whole foods  Exercise: avidly           Objective    Vitals:    11/07/24 1105 11/07/24 1110   BP: (!) 142/77 (!) 142/76   BP Location: Left arm Left arm   Patient Position: Sitting  "Sitting   BP Cuff Size: Adult Adult   Pulse: 99 (!) 102   Weight: 72.1 kg (159 lb)    Height: 1.753 m (5' 9\")         BP Readings from Last 4 Encounters:   11/07/24 (!) 142/76   10/24/24 124/66   09/10/24 122/60   08/08/24 (!) 165/69      Body mass index is 23.48 kg/m².   Wt Readings from Last 4 Encounters:   11/07/24 72.1 kg (159 lb)   10/24/24 71.7 kg (158 lb)   09/10/24 71.7 kg (158 lb)   08/08/24 71.7 kg (158 lb)      Physical Exam  Vitals reviewed.   Constitutional:       General: He is not in acute distress.     Appearance: He is not diaphoretic.   HENT:      Head: Normocephalic and atraumatic.   Eyes:      General: No scleral icterus.     Conjunctiva/sclera: Conjunctivae normal.   Neck:      Vascular: No carotid bruit.   Cardiovascular:      Rate and Rhythm: Normal rate and regular rhythm.      Heart sounds: Normal heart sounds. No murmur heard.     Comments: 1+ DP and PT pulse in the right  No pulse palpable left foot but does appear warm and well-perfused  Pulmonary:      Effort: Pulmonary effort is normal. No respiratory distress.      Breath sounds: Normal breath sounds. No wheezing or rales.   Musculoskeletal:      Right lower leg: No edema.      Left lower leg: No edema.   Skin:     Coloration: Skin is not pale.   Neurological:      General: No focal deficit present.      Mental Status: He is alert and oriented to person, place, and time.      Cranial Nerves: No cranial nerve deficit.      Coordination: Coordination normal.      Gait: Gait is intact. Gait normal.   Psychiatric:         Mood and Affect: Mood and affect normal.         Behavior: Behavior normal.          DATA REVIEW    Lab Results   Component Value Date/Time    CHOLSTRLTOT 197 08/07/2024 08:20 AM     (H) 08/07/2024 08:20 AM    HDL 67 08/07/2024 08:20 AM    TRIGLYCERIDE 56 08/07/2024 08:20 AM       Lab Results   Component Value Date/Time    LIPOPROTA 79 (H) 08/07/2024 08:20 AM      No results found for: \"APOB\"   No results found " "for: \"CRPHIGHSEN\"     Lab Results   Component Value Date/Time    SODIUM 138 08/07/2024 08:20 AM    POTASSIUM 4.3 08/07/2024 08:20 AM    CHLORIDE 101 08/07/2024 08:20 AM    CO2 24 08/07/2024 08:20 AM    GLUCOSE 111 (H) 08/07/2024 08:20 AM    BUN 25 (H) 08/07/2024 08:20 AM    CREATININE 1.62 (H) 08/07/2024 08:20 AM     Lab Results   Component Value Date/Time    ALKPHOSPHAT 93 08/07/2024 08:20 AM    ASTSGOT 19 08/07/2024 08:20 AM    ALTSGPT 16 08/07/2024 08:20 AM    TBILIRUBIN 0.4 08/07/2024 08:20 AM       No results found for: \"HBA1C\"    Lab Results   Component Value Date/Time    MALBCRT 18 08/07/2024 07:16 AM    MICROALBUR 1.5 08/07/2024 07:16 AM         Cardiovascular Imaging:    Aortoiliac duplex aug 2024  Evar without endoleak  Occluded left limb  Right limb patent    LE arterial aug 2024  Stenosis at distal anastomosis of left-right fem-fem  High grade stenosis right SFA  Occluded left SFA  3 vessel runoff bilat  Biphasic distal flow on left  Monophasic distal flow on right    CTA aortogram with runoff sep 2024  1.  Aortobiiliac stent graft in place with findings highly suspicious for endoleak proximally and at the aortic bifurcation  2.  Occlusion of the LEFT iliac segment of the stent graft with segmental reconstitution around the iliac bifurcation  3.  RIGHT to LEFT femoral-femoral bypass graft in place with findings suspicious for weblike stenosis at the LEFT common femoral arterial anastomosis  4.  Extensive atherosclerotic disease affecting the BILATERAL lower extremities and visceral arteries as detailed above  5.  RIGHT renal atrophy  6.  Colonic diverticulosis  7.  Cardiomegaly  8.  Cholelithiasis          Medical Decision Making:  Today's Assessment / Status / Plan:     1. Stage 3a chronic kidney disease  CBC WITHOUT DIFFERENTIAL      2. Hypothyroidism, unspecified type  THYROXINE (TOTAL)    TSH      3. Primary hypertension  Comp Metabolic Panel    MICROALBUMIN CREAT RATIO URINE      4. Abdominal aortic " aneurysm (AAA) without rupture, unspecified part (HCC)        5. Pure hypercholesterolemia  Comp Metabolic Panel    Lipid Profile    LIPOPROTEIN A    APOLIPOPROTEIN B      6. History of arterial bypass of lower extremity        7. History of coronary artery bypass surgery           Etiology of Established CVD if Present:     1) CAD status post CABG 2012 -no recurrent events or interventions.  No angina.  Great exercise tolerance  -Continue medical management as per below    2) AAA status post EVAR approximately 2013 - We discussed the importance of lifelong surveillance. No endoleak on duplex but possible on CTA  -Medical management lifestyle modifications per below  -defer decisions about whether or not any further w/u for possible endoleak to vasc surgery  -timing of surveillance imaging TBD after revision of fem-fem    3) PAD -status post femorofemoral as above.  He does have some more distal atherosclerotic disease per previous imaging.  He does not have classic claudication but does say both his legs are weaker than he thinks he should be for the level of exercise.  He uses the term atrophy  U/s notable for stenosis of distal anastomosis with decreased flow distal - confirmed on CTA  -await revision of fem-fem as scheduled by Dr. Landis for early dec  -continue medical management and lifestyle mod per below  -timing of surveillance imaging TBD after revision of fem-fem    LIPID MANAGEMENT  Qualifies for Statin Therapy Based on 2018 ACC/AHA Guidelines: yes, Secondary prevention - <76yo, ASCVD not at very high risk  Major ASCVD events: CABG and EVAR as above  High-risk conditions: N/A  Risk-enhancers: None  Currently on Statin: Yes  Tx goals: LDL-C <70 and apolipoprotein B less than 70  At goal? Unknown - did not have blood work done  Plan:   -Continue atorvastatin 40 mg daily -make sure to take every day  -Continue ezetimibe 10 mg a day  -Recheck fasting lipid panel, lipoprotein a, and apolipoprotein B prior to  next visit  -Intensify pharmacotherapy if needed based upon results    BLOOD PRESSURE MANAGEMENT  BP Goal ACC/AHA (2017) goal <130/80  Home BP at goal: No  Office BP at goal:  no  24h ABPM:  not ordered to date  RDN candidate? UNDECIDED  Contributing factors: ckd  No albuminuria 2024  Can rationalize pharmacological therapy in the future, but elected to make no changes to regimen until after surgery  Plan:   Monitoring:   - continue home BP monitoring  - consider ABPM if has further evidence white coat  Medications:  -Continue diltiazem 240 mg daily  -continue ramipril to 5 mg twice daily for now  -continue metoprolol 25 mg once daily for now, but hopefully taper off at next visit  -continue doxazosin 1 mg each night for now, but consider increase in dose in future    GLYCEMIC MANAGEMENT Normal  -Recheck fasting glucose    ANTITHROMBOTIC THERAPY   -Continue aspirin 81 mg daily    LIFESTYLE INTERVENTIONS:    Tobacco Use: Quit many years ago  - continued complete avoidance of all tobacco products     Physical Activity: Continue excellent exercise routine, particularly his daily walking    Weight Management and/or Nutrition: Maintain 30 pound weight loss.  Continue avoid processed foods    OTHER:     -CKD, stage IIIa -appears stable. No albuminuria in 2024. Blood pressure control and ACE inhibitor as above.  Avoid excess NSAIDs. Recheck gfr, lytes and cbc prior to next visit    -Hypothyroidism -appears stable based on symptoms and most recent TSH.  Continue current thyroxine as ordered by PCP. Recheck tsh and t4 prior to next visit    -SILVIO -previously on CPAP but has discontinued as he feels his symptoms have resolved with weight loss.  Defer any further workup and management to PCP and/or sleep medicine    - anemia, mild - may be due to ckd but other etiologies certainly in differential. Defer further w/u and management to pcp. Repeat cbc    Studies to Be Obtained: TBD after revision of fem-fem  Labs to Be Obtained:  As above prior to next visit    Follow up in: 2 mo    Michael J Bloch, M.D.   [facility]  [facility]  [phone]

## 2024-11-11 ENCOUNTER — PRE-ADMISSION TESTING (OUTPATIENT)
Dept: ADMISSIONS | Facility: MEDICAL CENTER | Age: 74
End: 2024-11-11
Attending: SURGERY
Payer: MEDICARE

## 2024-11-11 NOTE — OR NURSING
Pre admit appointment completed with Maxwell Salter for surgery/procedure on 12/4/24.    Medication and fasting instructions given per RN pre procedure protocol. Encouraged patient to increase oral intake the day prior to procedure including intake of electrolyte drinks such as Gatorade or electrolyte water. Patient instructed to drink 16 oz of clear liquid 2 hours prior to surgery.  Patient instructed to stop taking all vitamins and herbal supplements 7 days prior to surgery. Patient instructed to stop any NSAIDS 5 days prior to surgery, unless otherwise instructed by medical provider.     Patient verbalizes understanding of all instructions given. No further questions at this time. Medication instruction sheet stapled to testing passport for patient to receive at testing appointment on 11/21/24.

## 2024-11-12 ENCOUNTER — TELEPHONE (OUTPATIENT)
Dept: VASCULAR SURGERY | Facility: MEDICAL CENTER | Age: 74
End: 2024-11-12
Payer: MEDICARE

## 2024-11-13 DIAGNOSIS — Z76.0 MEDICATION REFILL: ICD-10-CM

## 2024-11-13 RX ORDER — DILTIAZEM HYDROCHLORIDE 240 MG/1
240 CAPSULE, COATED, EXTENDED RELEASE ORAL DAILY
Qty: 90 CAPSULE | Refills: 0 | Status: SHIPPED | OUTPATIENT
Start: 2024-11-13

## 2024-11-13 NOTE — TELEPHONE ENCOUNTER
I called patient and notified him that the time of procedure changed to 10:00 am and to check in @ 8:00 am in the TT 1st floor.

## 2024-11-21 ENCOUNTER — PRE-ADMISSION TESTING (OUTPATIENT)
Dept: ADMISSIONS | Facility: MEDICAL CENTER | Age: 74
End: 2024-11-21
Attending: SURGERY
Payer: MEDICARE

## 2024-11-21 DIAGNOSIS — Z01.812 PRE-OPERATIVE LABORATORY EXAMINATION: ICD-10-CM

## 2024-11-21 DIAGNOSIS — Z01.810 PRE-OPERATIVE CARDIOVASCULAR EXAMINATION: ICD-10-CM

## 2024-11-21 LAB
ABO GROUP BLD: NORMAL
ANION GAP SERPL CALC-SCNC: 10 MMOL/L (ref 7–16)
BLD GP AB SCN SERPL QL: NORMAL
BUN SERPL-MCNC: 22 MG/DL (ref 8–22)
CALCIUM SERPL-MCNC: 9 MG/DL (ref 8.5–10.5)
CHLORIDE SERPL-SCNC: 102 MMOL/L (ref 96–112)
CO2 SERPL-SCNC: 25 MMOL/L (ref 20–33)
CREAT SERPL-MCNC: 1.73 MG/DL (ref 0.5–1.4)
EKG IMPRESSION: NORMAL
ERYTHROCYTE [DISTWIDTH] IN BLOOD BY AUTOMATED COUNT: 46.3 FL (ref 35.9–50)
GFR SERPLBLD CREATININE-BSD FMLA CKD-EPI: 41 ML/MIN/1.73 M 2
GLUCOSE SERPL-MCNC: 120 MG/DL (ref 65–99)
HCT VFR BLD AUTO: 38.5 % (ref 42–52)
HGB BLD-MCNC: 12.4 G/DL (ref 14–18)
MCH RBC QN AUTO: 31.6 PG (ref 27–33)
MCHC RBC AUTO-ENTMCNC: 32.2 G/DL (ref 32.3–36.5)
MCV RBC AUTO: 98 FL (ref 81.4–97.8)
PLATELET # BLD AUTO: 202 K/UL (ref 164–446)
PMV BLD AUTO: 11 FL (ref 9–12.9)
POTASSIUM SERPL-SCNC: 4.1 MMOL/L (ref 3.6–5.5)
RBC # BLD AUTO: 3.93 M/UL (ref 4.7–6.1)
RH BLD: NORMAL
SODIUM SERPL-SCNC: 137 MMOL/L (ref 135–145)
WBC # BLD AUTO: 8.7 K/UL (ref 4.8–10.8)

## 2024-11-21 PROCEDURE — 86901 BLOOD TYPING SEROLOGIC RH(D): CPT

## 2024-11-21 PROCEDURE — 86900 BLOOD TYPING SEROLOGIC ABO: CPT

## 2024-11-21 PROCEDURE — 93005 ELECTROCARDIOGRAM TRACING: CPT

## 2024-11-21 PROCEDURE — 86850 RBC ANTIBODY SCREEN: CPT

## 2024-11-21 PROCEDURE — 36415 COLL VENOUS BLD VENIPUNCTURE: CPT

## 2024-11-21 PROCEDURE — 93010 ELECTROCARDIOGRAM REPORT: CPT | Performed by: INTERNAL MEDICINE

## 2024-11-21 PROCEDURE — 80048 BASIC METABOLIC PNL TOTAL CA: CPT

## 2024-11-21 PROCEDURE — 85027 COMPLETE CBC AUTOMATED: CPT

## 2024-12-03 ENCOUNTER — TELEPHONE (OUTPATIENT)
Dept: VASCULAR SURGERY | Facility: MEDICAL CENTER | Age: 74
End: 2024-12-03
Payer: MEDICARE

## 2024-12-03 NOTE — OR NURSING
Preadmit: Next day call to patient. Encouraged increased oral fluid intake the day prior to procedure/surgery including intake of electrolyte drinks such as Gatorade or electrolyte water. Patient may have clear liquids until 2 hours prior to surgery.  Surgery date 12/04/2024.

## 2024-12-04 ENCOUNTER — HOSPITAL ENCOUNTER (INPATIENT)
Facility: MEDICAL CENTER | Age: 74
LOS: 1 days | DRG: 254 | End: 2024-12-05
Attending: SURGERY | Admitting: SURGERY
Payer: MEDICARE

## 2024-12-04 ENCOUNTER — ANESTHESIA EVENT (OUTPATIENT)
Dept: SURGERY | Facility: MEDICAL CENTER | Age: 74
DRG: 254 | End: 2024-12-04
Payer: MEDICARE

## 2024-12-04 ENCOUNTER — ANESTHESIA (OUTPATIENT)
Dept: SURGERY | Facility: MEDICAL CENTER | Age: 74
DRG: 254 | End: 2024-12-04
Payer: MEDICARE

## 2024-12-04 DIAGNOSIS — G89.18 POST-OP PAIN: ICD-10-CM

## 2024-12-04 LAB — ABO + RH BLD: NORMAL

## 2024-12-04 PROCEDURE — C1781 MESH (IMPLANTABLE): HCPCS | Performed by: SURGERY

## 2024-12-04 PROCEDURE — 86900 BLOOD TYPING SEROLOGIC ABO: CPT

## 2024-12-04 PROCEDURE — 160035 HCHG PACU - 1ST 60 MINS PHASE I: Performed by: SURGERY

## 2024-12-04 PROCEDURE — A9270 NON-COVERED ITEM OR SERVICE: HCPCS | Performed by: SURGERY

## 2024-12-04 PROCEDURE — 700111 HCHG RX REV CODE 636 W/ 250 OVERRIDE (IP): Mod: JZ | Performed by: ANESTHESIOLOGY

## 2024-12-04 PROCEDURE — 160048 HCHG OR STATISTICAL LEVEL 1-5: Performed by: SURGERY

## 2024-12-04 PROCEDURE — 700111 HCHG RX REV CODE 636 W/ 250 OVERRIDE (IP): Performed by: SURGERY

## 2024-12-04 PROCEDURE — 35361 RECHANNELING OF ARTERY: CPT | Mod: AS,LT | Performed by: NURSE PRACTITIONER

## 2024-12-04 PROCEDURE — 35883 REVJ FEM ANAST NONAUTOG GRF: CPT | Mod: AS,LT | Performed by: NURSE PRACTITIONER

## 2024-12-04 PROCEDURE — 700102 HCHG RX REV CODE 250 W/ 637 OVERRIDE(OP): Performed by: SURGERY

## 2024-12-04 PROCEDURE — 770001 HCHG ROOM/CARE - MED/SURG/GYN PRIV*

## 2024-12-04 PROCEDURE — RXMED WILLOW AMBULATORY MEDICATION CHARGE: Performed by: NURSE PRACTITIONER

## 2024-12-04 PROCEDURE — 160041 HCHG SURGERY MINUTES - EA ADDL 1 MIN LEVEL 4: Performed by: SURGERY

## 2024-12-04 PROCEDURE — 700102 HCHG RX REV CODE 250 W/ 637 OVERRIDE(OP): Performed by: ANESTHESIOLOGY

## 2024-12-04 PROCEDURE — 160002 HCHG RECOVERY MINUTES (STAT): Performed by: SURGERY

## 2024-12-04 PROCEDURE — 86901 BLOOD TYPING SEROLOGIC RH(D): CPT

## 2024-12-04 PROCEDURE — 04WY0KZ REVISION OF NONAUTOLOGOUS TISSUE SUBSTITUTE IN LOWER ARTERY, OPEN APPROACH: ICD-10-PCS | Performed by: SURGERY

## 2024-12-04 PROCEDURE — 160029 HCHG SURGERY MINUTES - 1ST 30 MINS LEVEL 4: Performed by: SURGERY

## 2024-12-04 PROCEDURE — 36415 COLL VENOUS BLD VENIPUNCTURE: CPT

## 2024-12-04 PROCEDURE — 110454 HCHG SHELL REV 250: Performed by: SURGERY

## 2024-12-04 PROCEDURE — A9270 NON-COVERED ITEM OR SERVICE: HCPCS | Performed by: ANESTHESIOLOGY

## 2024-12-04 PROCEDURE — 700105 HCHG RX REV CODE 258: Performed by: SURGERY

## 2024-12-04 PROCEDURE — 35883 REVJ FEM ANAST NONAUTOG GRF: CPT | Mod: LT | Performed by: SURGERY

## 2024-12-04 PROCEDURE — 35361 RECHANNELING OF ARTERY: CPT | Mod: LT | Performed by: SURGERY

## 2024-12-04 PROCEDURE — 160009 HCHG ANES TIME/MIN: Performed by: SURGERY

## 2024-12-04 PROCEDURE — 700101 HCHG RX REV CODE 250: Performed by: ANESTHESIOLOGY

## 2024-12-04 PROCEDURE — 700101 HCHG RX REV CODE 250: Performed by: SURGERY

## 2024-12-04 PROCEDURE — 04CL0ZZ EXTIRPATION OF MATTER FROM LEFT FEMORAL ARTERY, OPEN APPROACH: ICD-10-PCS | Performed by: SURGERY

## 2024-12-04 DEVICE — PATCH .8X8CM XENOSURE BIOLOGIC VASCULAR---ORDER IN MULTIPLES OF 5---: Type: IMPLANTABLE DEVICE | Site: GROIN | Status: FUNCTIONAL

## 2024-12-04 RX ORDER — HYDROCODONE BITARTRATE AND ACETAMINOPHEN 5; 325 MG/1; MG/1
1 TABLET ORAL EVERY 4 HOURS PRN
Qty: 10 TABLET | Refills: 0 | Status: SHIPPED | OUTPATIENT
Start: 2024-12-04 | End: 2024-12-07

## 2024-12-04 RX ORDER — OXYCODONE HYDROCHLORIDE 5 MG/1
5 TABLET ORAL
Status: DISCONTINUED | OUTPATIENT
Start: 2024-12-04 | End: 2024-12-05 | Stop reason: HOSPADM

## 2024-12-04 RX ORDER — HEPARIN SODIUM 1000 [USP'U]/ML
INJECTION, SOLUTION INTRAVENOUS; SUBCUTANEOUS PRN
Status: DISCONTINUED | OUTPATIENT
Start: 2024-12-04 | End: 2024-12-04 | Stop reason: SURG

## 2024-12-04 RX ORDER — HALOPERIDOL 5 MG/ML
1 INJECTION INTRAMUSCULAR EVERY 6 HOURS PRN
Status: DISCONTINUED | OUTPATIENT
Start: 2024-12-04 | End: 2024-12-05 | Stop reason: HOSPADM

## 2024-12-04 RX ORDER — OXYCODONE HYDROCHLORIDE 10 MG/1
10 TABLET ORAL
Status: DISCONTINUED | OUTPATIENT
Start: 2024-12-04 | End: 2024-12-05 | Stop reason: HOSPADM

## 2024-12-04 RX ORDER — RAMIPRIL 5 MG/1
5 CAPSULE ORAL
Status: DISCONTINUED | OUTPATIENT
Start: 2024-12-05 | End: 2024-12-05 | Stop reason: HOSPADM

## 2024-12-04 RX ORDER — ONDANSETRON 2 MG/ML
4 INJECTION INTRAMUSCULAR; INTRAVENOUS
Status: DISCONTINUED | OUTPATIENT
Start: 2024-12-04 | End: 2024-12-04 | Stop reason: HOSPADM

## 2024-12-04 RX ORDER — HYDROMORPHONE HYDROCHLORIDE 1 MG/ML
0.5 INJECTION, SOLUTION INTRAMUSCULAR; INTRAVENOUS; SUBCUTANEOUS
Status: DISCONTINUED | OUTPATIENT
Start: 2024-12-04 | End: 2024-12-05 | Stop reason: HOSPADM

## 2024-12-04 RX ORDER — HYDROMORPHONE HYDROCHLORIDE 1 MG/ML
0.1 INJECTION, SOLUTION INTRAMUSCULAR; INTRAVENOUS; SUBCUTANEOUS
Status: DISCONTINUED | OUTPATIENT
Start: 2024-12-04 | End: 2024-12-04 | Stop reason: HOSPADM

## 2024-12-04 RX ORDER — LIDOCAINE HYDROCHLORIDE 20 MG/ML
INJECTION, SOLUTION EPIDURAL; INFILTRATION; INTRACAUDAL; PERINEURAL PRN
Status: DISCONTINUED | OUTPATIENT
Start: 2024-12-04 | End: 2024-12-04 | Stop reason: SURG

## 2024-12-04 RX ORDER — LABETALOL HYDROCHLORIDE 5 MG/ML
10 INJECTION, SOLUTION INTRAVENOUS EVERY 4 HOURS PRN
Status: DISCONTINUED | OUTPATIENT
Start: 2024-12-04 | End: 2024-12-05 | Stop reason: HOSPADM

## 2024-12-04 RX ORDER — CEFAZOLIN SODIUM 1 G/3ML
INJECTION, POWDER, FOR SOLUTION INTRAMUSCULAR; INTRAVENOUS PRN
Status: DISCONTINUED | OUTPATIENT
Start: 2024-12-04 | End: 2024-12-04 | Stop reason: SURG

## 2024-12-04 RX ORDER — MIDAZOLAM HYDROCHLORIDE 1 MG/ML
1 INJECTION INTRAMUSCULAR; INTRAVENOUS
Status: DISCONTINUED | OUTPATIENT
Start: 2024-12-04 | End: 2024-12-04 | Stop reason: HOSPADM

## 2024-12-04 RX ORDER — SCOLOPAMINE TRANSDERMAL SYSTEM 1 MG/1
1 PATCH, EXTENDED RELEASE TRANSDERMAL
Status: DISCONTINUED | OUTPATIENT
Start: 2024-12-04 | End: 2024-12-05 | Stop reason: HOSPADM

## 2024-12-04 RX ORDER — ONDANSETRON 2 MG/ML
INJECTION INTRAMUSCULAR; INTRAVENOUS PRN
Status: DISCONTINUED | OUTPATIENT
Start: 2024-12-04 | End: 2024-12-04 | Stop reason: SURG

## 2024-12-04 RX ORDER — PROTAMINE SULFATE 10 MG/ML
INJECTION, SOLUTION INTRAVENOUS PRN
Status: DISCONTINUED | OUTPATIENT
Start: 2024-12-04 | End: 2024-12-04 | Stop reason: SURG

## 2024-12-04 RX ORDER — MEPERIDINE HYDROCHLORIDE 25 MG/ML
12.5 INJECTION INTRAMUSCULAR; INTRAVENOUS; SUBCUTANEOUS
Status: DISCONTINUED | OUTPATIENT
Start: 2024-12-04 | End: 2024-12-04 | Stop reason: HOSPADM

## 2024-12-04 RX ORDER — CLONIDINE HYDROCHLORIDE 0.1 MG/1
0.2 TABLET ORAL
Status: DISCONTINUED | OUTPATIENT
Start: 2024-12-04 | End: 2024-12-05 | Stop reason: HOSPADM

## 2024-12-04 RX ORDER — DIPHENHYDRAMINE HYDROCHLORIDE 50 MG/ML
12.5 INJECTION INTRAMUSCULAR; INTRAVENOUS
Status: DISCONTINUED | OUTPATIENT
Start: 2024-12-04 | End: 2024-12-04 | Stop reason: HOSPADM

## 2024-12-04 RX ORDER — HALOPERIDOL 5 MG/ML
1 INJECTION INTRAMUSCULAR
Status: DISCONTINUED | OUTPATIENT
Start: 2024-12-04 | End: 2024-12-04 | Stop reason: HOSPADM

## 2024-12-04 RX ORDER — EZETIMIBE 10 MG/1
10 TABLET ORAL DAILY
Status: DISCONTINUED | OUTPATIENT
Start: 2024-12-05 | End: 2024-12-05 | Stop reason: HOSPADM

## 2024-12-04 RX ORDER — SODIUM CHLORIDE, SODIUM LACTATE, POTASSIUM CHLORIDE, CALCIUM CHLORIDE 600; 310; 30; 20 MG/100ML; MG/100ML; MG/100ML; MG/100ML
INJECTION, SOLUTION INTRAVENOUS CONTINUOUS
Status: DISCONTINUED | OUTPATIENT
Start: 2024-12-04 | End: 2024-12-04 | Stop reason: HOSPADM

## 2024-12-04 RX ORDER — OXYCODONE HCL 5 MG/5 ML
5 SOLUTION, ORAL ORAL
Status: COMPLETED | OUTPATIENT
Start: 2024-12-04 | End: 2024-12-04

## 2024-12-04 RX ORDER — DEXAMETHASONE SODIUM PHOSPHATE 4 MG/ML
INJECTION, SOLUTION INTRA-ARTICULAR; INTRALESIONAL; INTRAMUSCULAR; INTRAVENOUS; SOFT TISSUE PRN
Status: DISCONTINUED | OUTPATIENT
Start: 2024-12-04 | End: 2024-12-04 | Stop reason: SURG

## 2024-12-04 RX ORDER — CLONIDINE HYDROCHLORIDE 0.1 MG/1
0.1 TABLET ORAL
Status: DISCONTINUED | OUTPATIENT
Start: 2024-12-04 | End: 2024-12-05 | Stop reason: HOSPADM

## 2024-12-04 RX ORDER — ATORVASTATIN CALCIUM 40 MG/1
40 TABLET, FILM COATED ORAL NIGHTLY
Status: DISCONTINUED | OUTPATIENT
Start: 2024-12-04 | End: 2024-12-05 | Stop reason: HOSPADM

## 2024-12-04 RX ORDER — SODIUM CHLORIDE 9 MG/ML
INJECTION, SOLUTION INTRAVENOUS CONTINUOUS
Status: DISPENSED | OUTPATIENT
Start: 2024-12-04 | End: 2024-12-04

## 2024-12-04 RX ORDER — ONDANSETRON 2 MG/ML
4 INJECTION INTRAMUSCULAR; INTRAVENOUS EVERY 4 HOURS PRN
Status: DISCONTINUED | OUTPATIENT
Start: 2024-12-04 | End: 2024-12-05 | Stop reason: HOSPADM

## 2024-12-04 RX ORDER — DILTIAZEM HYDROCHLORIDE 120 MG/1
240 CAPSULE, COATED, EXTENDED RELEASE ORAL DAILY
Status: DISCONTINUED | OUTPATIENT
Start: 2024-12-05 | End: 2024-12-05 | Stop reason: HOSPADM

## 2024-12-04 RX ORDER — ASPIRIN 81 MG/1
81 TABLET ORAL DAILY
Status: DISCONTINUED | OUTPATIENT
Start: 2024-12-05 | End: 2024-12-05 | Stop reason: HOSPADM

## 2024-12-04 RX ORDER — ENOXAPARIN SODIUM 100 MG/ML
30 INJECTION SUBCUTANEOUS EVERY 12 HOURS
Status: DISCONTINUED | OUTPATIENT
Start: 2024-12-05 | End: 2024-12-05 | Stop reason: HOSPADM

## 2024-12-04 RX ORDER — HEPARIN SODIUM,PORCINE 1000/ML
VIAL (ML) INJECTION
Status: DISCONTINUED | OUTPATIENT
Start: 2024-12-04 | End: 2024-12-04 | Stop reason: HOSPADM

## 2024-12-04 RX ORDER — HYDROMORPHONE HYDROCHLORIDE 1 MG/ML
0.2 INJECTION, SOLUTION INTRAMUSCULAR; INTRAVENOUS; SUBCUTANEOUS
Status: DISCONTINUED | OUTPATIENT
Start: 2024-12-04 | End: 2024-12-04 | Stop reason: HOSPADM

## 2024-12-04 RX ORDER — DOXAZOSIN 2 MG/1
1 TABLET ORAL DAILY
Status: DISCONTINUED | OUTPATIENT
Start: 2024-12-05 | End: 2024-12-05 | Stop reason: HOSPADM

## 2024-12-04 RX ORDER — DIPHENHYDRAMINE HYDROCHLORIDE 50 MG/ML
25 INJECTION INTRAMUSCULAR; INTRAVENOUS EVERY 6 HOURS PRN
Status: DISCONTINUED | OUTPATIENT
Start: 2024-12-04 | End: 2024-12-05 | Stop reason: HOSPADM

## 2024-12-04 RX ORDER — DEXAMETHASONE SODIUM PHOSPHATE 4 MG/ML
4 INJECTION, SOLUTION INTRA-ARTICULAR; INTRALESIONAL; INTRAMUSCULAR; INTRAVENOUS; SOFT TISSUE
Status: DISCONTINUED | OUTPATIENT
Start: 2024-12-04 | End: 2024-12-05 | Stop reason: HOSPADM

## 2024-12-04 RX ORDER — ALBUTEROL SULFATE 5 MG/ML
2.5 SOLUTION RESPIRATORY (INHALATION)
Status: DISCONTINUED | OUTPATIENT
Start: 2024-12-04 | End: 2024-12-04 | Stop reason: HOSPADM

## 2024-12-04 RX ORDER — HYDROMORPHONE HYDROCHLORIDE 1 MG/ML
0.4 INJECTION, SOLUTION INTRAMUSCULAR; INTRAVENOUS; SUBCUTANEOUS
Status: DISCONTINUED | OUTPATIENT
Start: 2024-12-04 | End: 2024-12-04 | Stop reason: HOSPADM

## 2024-12-04 RX ORDER — SODIUM CHLORIDE, SODIUM LACTATE, POTASSIUM CHLORIDE, CALCIUM CHLORIDE 600; 310; 30; 20 MG/100ML; MG/100ML; MG/100ML; MG/100ML
INJECTION, SOLUTION INTRAVENOUS CONTINUOUS
Status: ACTIVE | OUTPATIENT
Start: 2024-12-04 | End: 2024-12-04

## 2024-12-04 RX ORDER — METOPROLOL SUCCINATE 25 MG/1
25 TABLET, EXTENDED RELEASE ORAL DAILY
Status: DISCONTINUED | OUTPATIENT
Start: 2024-12-05 | End: 2024-12-05 | Stop reason: HOSPADM

## 2024-12-04 RX ORDER — OXYCODONE HCL 5 MG/5 ML
10 SOLUTION, ORAL ORAL
Status: COMPLETED | OUTPATIENT
Start: 2024-12-04 | End: 2024-12-04

## 2024-12-04 RX ADMIN — OMEPRAZOLE 40 MG: 20 CAPSULE, DELAYED RELEASE ORAL at 17:48

## 2024-12-04 RX ADMIN — SODIUM CHLORIDE, POTASSIUM CHLORIDE, SODIUM LACTATE AND CALCIUM CHLORIDE: 600; 310; 30; 20 INJECTION, SOLUTION INTRAVENOUS at 08:36

## 2024-12-04 RX ADMIN — PROPOFOL 160 MG: 10 INJECTION, EMULSION INTRAVENOUS at 10:25

## 2024-12-04 RX ADMIN — ONDANSETRON 4 MG: 2 INJECTION INTRAMUSCULAR; INTRAVENOUS at 11:14

## 2024-12-04 RX ADMIN — FENTANYL CITRATE 50 MCG: 50 INJECTION, SOLUTION INTRAMUSCULAR; INTRAVENOUS at 12:50

## 2024-12-04 RX ADMIN — SODIUM CHLORIDE: 9 INJECTION, SOLUTION INTRAVENOUS at 17:30

## 2024-12-04 RX ADMIN — OXYCODONE HYDROCHLORIDE 10 MG: 5 SOLUTION ORAL at 12:50

## 2024-12-04 RX ADMIN — ATORVASTATIN CALCIUM 40 MG: 40 TABLET, FILM COATED ORAL at 20:13

## 2024-12-04 RX ADMIN — CEFAZOLIN 2 G: 1 INJECTION, POWDER, FOR SOLUTION INTRAMUSCULAR; INTRAVENOUS at 10:45

## 2024-12-04 RX ADMIN — PROTAMINE SULFATE 30 MG: 10 INJECTION, SOLUTION INTRAVENOUS at 11:12

## 2024-12-04 RX ADMIN — HEPARIN SODIUM 8000 UNITS: 1000 INJECTION, SOLUTION INTRAVENOUS; SUBCUTANEOUS at 10:48

## 2024-12-04 RX ADMIN — DEXAMETHASONE SODIUM PHOSPHATE 8 MG: 4 INJECTION INTRA-ARTICULAR; INTRALESIONAL; INTRAMUSCULAR; INTRAVENOUS; SOFT TISSUE at 10:41

## 2024-12-04 RX ADMIN — FENTANYL CITRATE 100 MCG: 50 INJECTION, SOLUTION INTRAMUSCULAR; INTRAVENOUS at 10:22

## 2024-12-04 RX ADMIN — LIDOCAINE HYDROCHLORIDE 50 MG: 20 INJECTION, SOLUTION EPIDURAL; INFILTRATION; INTRACAUDAL; PERINEURAL at 10:25

## 2024-12-04 ASSESSMENT — PAIN DESCRIPTION - PAIN TYPE
TYPE: SURGICAL PAIN
TYPE: ACUTE PAIN;SURGICAL PAIN

## 2024-12-04 ASSESSMENT — COGNITIVE AND FUNCTIONAL STATUS - GENERAL
DAILY ACTIVITIY SCORE: 24
MOBILITY SCORE: 24
SUGGESTED CMS G CODE MODIFIER MOBILITY: CH
SUGGESTED CMS G CODE MODIFIER DAILY ACTIVITY: CH

## 2024-12-04 ASSESSMENT — LIFESTYLE VARIABLES
TOTAL SCORE: 0
CONSUMPTION TOTAL: NEGATIVE
ALCOHOL_USE: YES
ON A TYPICAL DAY WHEN YOU DRINK ALCOHOL HOW MANY DRINKS DO YOU HAVE: 1
EVER FELT BAD OR GUILTY ABOUT YOUR DRINKING: NO
TOTAL SCORE: 0
HAVE YOU EVER FELT YOU SHOULD CUT DOWN ON YOUR DRINKING: NO
HAVE PEOPLE ANNOYED YOU BY CRITICIZING YOUR DRINKING: NO
TOTAL SCORE: 0
AVERAGE NUMBER OF DAYS PER WEEK YOU HAVE A DRINK CONTAINING ALCOHOL: 1
EVER HAD A DRINK FIRST THING IN THE MORNING TO STEADY YOUR NERVES TO GET RID OF A HANGOVER: NO
DOES PATIENT WANT TO STOP DRINKING: NO
HOW MANY TIMES IN THE PAST YEAR HAVE YOU HAD 5 OR MORE DRINKS IN A DAY: 0

## 2024-12-04 ASSESSMENT — SOCIAL DETERMINANTS OF HEALTH (SDOH)
WITHIN THE LAST YEAR, HAVE TO BEEN RAPED OR FORCED TO HAVE ANY KIND OF SEXUAL ACTIVITY BY YOUR PARTNER OR EX-PARTNER?: NO
IN THE PAST 12 MONTHS, HAS THE ELECTRIC, GAS, OIL, OR WATER COMPANY THREATENED TO SHUT OFF SERVICE IN YOUR HOME?: NO
WITHIN THE PAST 12 MONTHS, YOU WORRIED THAT YOUR FOOD WOULD RUN OUT BEFORE YOU GOT THE MONEY TO BUY MORE: NEVER TRUE
WITHIN THE PAST 12 MONTHS, THE FOOD YOU BOUGHT JUST DIDN'T LAST AND YOU DIDN'T HAVE MONEY TO GET MORE: NEVER TRUE
WITHIN THE LAST YEAR, HAVE YOU BEEN AFRAID OF YOUR PARTNER OR EX-PARTNER?: NO
WITHIN THE LAST YEAR, HAVE YOU BEEN KICKED, HIT, SLAPPED, OR OTHERWISE PHYSICALLY HURT BY YOUR PARTNER OR EX-PARTNER?: NO
WITHIN THE LAST YEAR, HAVE YOU BEEN HUMILIATED OR EMOTIONALLY ABUSED IN OTHER WAYS BY YOUR PARTNER OR EX-PARTNER?: NO

## 2024-12-04 ASSESSMENT — PATIENT HEALTH QUESTIONNAIRE - PHQ9
SUM OF ALL RESPONSES TO PHQ9 QUESTIONS 1 AND 2: 0
1. LITTLE INTEREST OR PLEASURE IN DOING THINGS: NOT AT ALL
2. FEELING DOWN, DEPRESSED, IRRITABLE, OR HOPELESS: NOT AT ALL

## 2024-12-04 ASSESSMENT — FIBROSIS 4 INDEX: FIB4 SCORE: 1.76

## 2024-12-04 ASSESSMENT — PAIN SCALES - GENERAL: PAIN_LEVEL: 3

## 2024-12-04 NOTE — PROGRESS NOTES
Medication history reviewed with PT at bedside    [facility] is complete per PT reporting    Allergies reviewed.     Patient denies any outpatient antibiotics in the last 30 days.     Patient is not taking anticoagulants.    Preferred pharmacy for this visit - [facility] on [address] ([phone])

## 2024-12-04 NOTE — ANESTHESIA TIME REPORT
Anesthesia Start and Stop Event Times       Date Time Event    12/4/2024 0931 Ready for Procedure     1020 Anesthesia Start     1129 Anesthesia Stop          Responsible Staff  12/04/24      Name Role Begin End    Nando Staton M.D. Anesth 1020 1129          Overtime Reason:  no overtime (within assigned shift)    Comments:

## 2024-12-04 NOTE — H&P
Vascular Surgery  Established Patient Evaluation     Patient:Maxwell Salter  MRN:[id number]  Primary care physician:Lenora Ty M.D.     Vascular Consultant: Lonnie Landis MD   12/4/24  __________________________________________     Chief Complaint/Reason for Visit:           Chief Complaint   Patient presents with    Results       Scan review             History of Present Illness:   Maxwell Salter is a 74 y.o. year old male who was undergone aortic endograft in the past.  He also has a femoral to femoral artery bypass.  There was some concern from vascular medicine that he had a stenosis within the distal anastomosis of his right to left femoral to femoral artery bypass.  I elected to proceed with a CTA to further evaluate both the endograft as well as the femorofemoral bypass.  The endograft has what appears to be is type I endoleak proximally however it does not communicate with the aneurysm sense which suggest that there may be a seal just distal to that.  The aneurysm remains small and I believe that his aneurysm is excluded and there is no indication to revise or address anything related to the endograft itself.  The CTA does confirm what appears to be at least a moderate stenosis within the distal anastomosis of the femoral to femoral graft.  See discussion below     Past Medical History:      Past Medical History        Past Medical History:   Diagnosis Date    Heart attack (HCC)      Hyperlipidemia      Hypertension      Kidney disease      Thyroid disease           Past Surgical History:      Past Surgical History         Past Surgical History:   Procedure Laterality Date    CORONARY ARTERY BYPASS, 3   2014         Allergies:   Allergies   No Known Allergies     Medications:      Encounter Medications          Outpatient Encounter Medications as of 10/24/2024   Medication Sig Dispense Refill    atorvastatin (LIPITOR) 40 MG Tab Take 1 Tablet by mouth every evening. 90 Tablet 3    metoprolol SR (TOPROL  XL) 25 MG TABLET SR 24 HR Take 1 Tablet by mouth every day. 30 Tablet 11    doxazosin (CARDURA) 2 MG Tab Take 0.5 Tablets by mouth every day. 90 Tablet 3    ramipril (ALTACE) 5 MG Cap Take 1 Capsule by mouth 2 times a day. 180 Capsule 3    ezetimibe (ZETIA) 10 MG Tab Take 1 Tablet by mouth every day. 90 Tablet 3    levothyroxine (SYNTHROID) 150 MCG Tab TAKE 1 TABLET EVERY MORNINGON AN EMPTY STOMACH 90 Tablet 3    tadalafil (CIALIS) 20 MG tablet TAKE 1 TABLET BY MOUTH AS NEEDED FOR ERECTILE DYSFUNCTION 10 Tablet 3    dilTIAZem CD (CARDIZEM CD) 240 MG CAPSULE SR 24 HR TAKE 1 CAPSULE DAILY 90 Capsule 3    omeprazole (PRILOSEC) 40 MG delayed-release capsule TAKE 1 CAPSULE DAILY 90 Capsule 3    Aspirin 81 MG Cap Take 81 mg by mouth every day. 90 Capsule 3      No facility-administered encounter medications on file as of 10/24/2024.         Social History:      Social History               Socioeconomic History    Marital status: Single       Spouse name: Not on file    Number of children: Not on file    Years of education: Not on file    Highest education level: Bachelor's degree (e.g., BA, AB, BS)   Occupational History    Not on file   Tobacco Use    Smoking status: Former       Current packs/day: 0.00       Average packs/day: 1 pack/day for 43.7 years (43.7 ttl pk-yrs)       Types: Cigarettes       Start date: 9/1/1970       Quit date: 6/1/2014       Years since quitting: 10.4    Smokeless tobacco: Never   Vaping Use    Vaping status: Never Used   Substance and Sexual Activity    Alcohol use: Yes       Alcohol/week: 1.2 oz       Types: 2 Cans of beer per week    Drug use: Never    Sexual activity: Not Currently       Partners: Female   Other Topics Concern    Not on file   Social History Narrative    Not on file      Social Determinants of Health           Financial Resource Strain: Low Risk  (7/8/2024)     Overall Financial Resource Strain (CARDIA)      Difficulty of Paying Living Expenses: Not very hard   Food  Insecurity: No Food Insecurity (7/8/2024)     Hunger Vital Sign      Worried About Running Out of Food in the Last Year: Never true      Ran Out of Food in the Last Year: Never true   Transportation Needs: No Transportation Needs (7/8/2024)     PRAPARE - Transportation      Lack of Transportation (Medical): No      Lack of Transportation (Non-Medical): No   Physical Activity: Sufficiently Active (7/8/2024)     Exercise Vital Sign      Days of Exercise per Week: 5 days      Minutes of Exercise per Session: 50 min   Stress: No Stress Concern Present (7/8/2024)     [employer] [place] [employer] - Occupational Stress Questionnaire      Feeling of Stress : Not at all   Social Connections: Moderately Integrated (7/8/2024)     Social Connection and Isolation Panel [NHANES]      Frequency of Communication with Friends and Family: More than three times a week      Frequency of Social Gatherings with Friends and Family: Three times a week      Attends Islam Services: 1 to 4 times per year      Active Member of Clubs or Organizations: No      Attends Club or Organization Meetings: 1 to 4 times per year      Marital Status:    Intimate Partner Violence: Not on file   Housing Stability: Low Risk  (7/8/2024)     Housing Stability Vital Sign      Unable to Pay for Housing in the Last Year: No      Number of Places Lived in the Last Year: 1      Unstable Housing in the Last Year: No         Tobacco Use History   Social History           Tobacco Use   Smoking Status Former    Current packs/day: 0.00    Average packs/day: 1 pack/day for 43.7 years (43.7 ttl pk-yrs)    Types: Cigarettes    Start date: 9/1/1970    Quit date: 6/1/2014    Years since quitting: 10.4   Smokeless Tobacco Never         Social History           Substance and Sexual Activity   Alcohol Use Yes    Alcohol/week: 1.2 oz    Types: 2 Cans of beer per week      Social History          Substance and Sexual Activity   Drug Use Never      Family  "History:      Family History         Family History   Problem Relation Age of Onset    Heart Disease Brother              Review of Systems:   Constitutional:          Negative for fever or chills  HENT:                         Negative for hearing loss or tinnitus    Eyes:                           Negative for blurred vision or loss of vision  Respiratory:               Negative for cough or hemoptysis  Cardiac:                      Negative for chest pain or palpitations  Vascular:                    Positive for claudication, Negative for rest pain  Gastrointestinal:       Negative for vomiting or abdominal pain                                      Negative for hematochezia or melena   Genitourinary:           Negative for dysuria or hematuria   Musculoskeletal:       Negative for myalgias or acute joint pain  Skin:                           Negative for itching or rash  Neurological:             Negative for dizziness or headaches                                      Negative for speech disturbance                                      Negative for extremity weakness or paresthesias  Endo/Heme:               Negative for easy bruising or bleeding            Exam:   /66 (BP Location: Left arm, Patient Position: Sitting, BP Cuff Size: Adult)   Pulse 87   Temp 37.1 °C (98.8 °F) (Temporal)   Ht 1.753 m (5' 9\")   Wt 71.7 kg (158 lb)   SpO2 97%   BMI 23.33 kg/m²      Constitutional:          Alert, oriented, no acute distress  HEENT:                       Normocephalic and atraumatic, EOMI  Neck:                          Supple, no JVD,   Cardiovascular:         Regular rate and rhythm,   Pulmonary:                Good air entry bilaterally,    Abdominal:                Soft, non-tender, non-distended                                          Musculoskeletal:       No tenderness, no deformity  Neurological:             CN II-XII grossly intact, no focal deficits  Skin:                           Skin is warm " and dry. No rash noted.  Vascular exam:                                   Imaging:   CTA 9/26/2024     IMPRESSION:     1.  Aortobiiliac stent graft in place with findings highly suspicious for endoleak proximally and at the aortic bifurcation  2.  Occlusion of the LEFT iliac segment of the stent graft with segmental reconstitution around the iliac bifurcation  3.  RIGHT to LEFT femoral-femoral bypass graft in place with findings suspicious for weblike stenosis at the LEFT common femoral arterial anastomosis  4.  Extensive atherosclerotic disease affecting the BILATERAL lower extremities and visceral arteries as detailed above  5.  RIGHT renal atrophy  6.  Colonic diverticulosis  7.  Cardiomegaly  8.  Cholelithiasis     Assessment and Plan:   -Status post aortic stent graft repair-stent graft looks like it is excluding the aortic aneurysm.  There is some contrast around the proximal neck but it does not track into the aneurysm sac.  There is also no evidence of expansion of the native aneurysm sac and appears to be stable and therefore will be left alone.     Distal anastomotic stenosis within the femoral to femoral bypass graft.  Based on the diminished ankle-brachial index and flow velocity seen on noninvasive studies along with the CTA I do think that there is an indication to revise the distal anastomosis of the femorofemoral bypass graft to maintain primary assisted patency.     I discussed this with the patient he agrees to proceed.              _____________________________________________________  Lonnie Landis MD  [facility] Vascular Surgery [facility]  [phone]  [address], [place] [address]

## 2024-12-04 NOTE — OP REPORT
ALAN OPERATIVE NOTE    12/04/24      PRE-OPERATIVE DIAGNOSIS -     Peripheral arterial disease  Stenosis femorofemoral bypass graft left femoral anastomosis    POST-OPERATIVE DIAGNOSIS -same    PROCEDURE PERFORMED -     Revision right to left femoral to femoral artery bypass with bovine patch angioplasty of left femoral anastomosis  Left femoral endarterectomy    SURGEON - Lonnie Landis M.D.    ASSISTANT - ALENA Guzman, RNFA    TYPE OF ANESTHESIA -  * No anesthesia type entered *     ANESTHESIOLOGIST  - Anesthesiologist: Nando Staton M.D.     SPECIMENS -     INDICATIONS - 74 y.o. gentleman who has a aortic endograft in place as well as a right to left femoral to femoral bypass.  Multiple studies indicate that the patient has a stenosis in the left femoral anastomosis and the patient is being taken to the operating room for repair of that stenosis to maintain primary assisted patency.    PROCEDURE IN DETAIL -    Patient was taken to the operating suite placed in supine position.  After adequate anesthesia the patient's bilateral groins lower abdomen and thighs were prepped and draped in sterile fashion.  Incision was made in longitudinal fashion overlying the left common femoral artery.  The incision was carried down through subcutaneous tissue and the distal aspect of the right to left femoral to femoral graft was dissected from the surrounding tissue and isolated.  This look like a 6 mm ringed graft.  Next the common femoral profunda femoris and proximal superficial femoral arteries were dissected from the surrounding tissue.  After all vessels were exposed 8000 units of heparin was administered.  After an appropriate period of time the vessels were sequentially clamped and a longitudinal arteriotomy was made in the common femoral artery carried across the anastomosis onto the PTFE graft.  Exposing this anastomosis we did indeed identify stenosis which was likely the result of recurrent  atherosclerotic disease along with some intimal hyperplasia.  Small focal little endarterectomy was performed in this region and a bovine patch was sewn in place using 5-0 Prolene sutures in running fashion.  Just prior to completing the patch all vessels were flushed and irrigated and flow was established.  Hemostasis was assured.  30 mg of protamine was administered.  The subcutaneous tissue was closed using multiple layers of 2-0 Vicryl running sutures followed by skin staples and sterile dressings.          _______________________  Lonnie Landis M.D.

## 2024-12-04 NOTE — ANESTHESIA PROCEDURE NOTES
Airway    Date/Time: 12/4/2024 10:26 AM    Performed by: Nando Staton M.D.  Authorized by: Nando Staton M.D.    Location:  OR  Urgency:  Elective  Difficult Airway: No    Indications for Airway Management:  Anesthesia      Spontaneous Ventilation: absent    Sedation Level:  Deep  Preoxygenated: Yes    Final Airway Type:  Supraglottic airway  Final Supraglottic Airway:  Standard LMA    SGA Size:  4  Number of Attempts at Approach:  1  Number of Other Approaches Attempted:  0

## 2024-12-04 NOTE — ANESTHESIA PREPROCEDURE EVALUATION
Case: [id number] Date/Time: 12/04/24 0945    Procedure: REVISION OF RIGHT TO LEFT FEMORAL TO FEMORAL ARTERY BYPASS    Pre-op diagnosis: PERIPHERAL ARTERIAL OCCLUSIVE DISEASE    Location: [facility] OR 06 / SURGERY [facility]    Surgeons: Lonnie Landis M.D.            Relevant Problems   ANESTHESIA   (positive) SILVIO (obstructive sleep apnea)      CARDIAC   (positive) Abdominal aortic aneurysm (AAA) (HCC)   (positive) Arteriosclerosis of coronary artery   (positive) Hypertension         (positive) Stage 3a chronic kidney disease      ENDO   (positive) Hypothyroidism       Physical Exam    Airway   Mallampati: II  TM distance: >3 FB  Neck ROM: full       Cardiovascular - normal exam  Rhythm: regular  Rate: normal  (-) murmur     Dental - normal exam           Pulmonary - normal exam  (+) wheezes     Abdominal    Neurological - normal exam               Anesthesia Plan    ASA 3   ASA physical status 3 criteria: MI or angina - history (> 3 months), hypertension - poorly controlled and COPD - poorly controlled    Plan - general       Airway plan will be LMA                    Informed Consent:

## 2024-12-04 NOTE — ANESTHESIA POSTPROCEDURE EVALUATION
Patient: Maxwell Salter    Procedure Summary       Date: 12/04/24 Room / Location: [facility] 06 / SURGERY [facility]    Anesthesia Start: 1020 Anesthesia Stop: 1129    Procedure: REVISION OF RIGHT TO LEFT FEMORAL TO FEMORAL ARTERY BYPASS (Left: Groin) Diagnosis: (PERIPHERAL ARTERIAL OCCLUSIVE DISEASE)    Surgeons: Lonnie Landis M.D. Responsible Provider: Nando Staton M.D.    Anesthesia Type: general ASA Status: 3            Final Anesthesia Type: general  Last vitals  BP   Blood Pressure : (!) 144/67    Temp   36.6 °C (97.9 °F)    Pulse   76   Resp   18    SpO2   96 %      Anesthesia Post Evaluation    Patient location during evaluation: PACU  Patient participation: complete - patient participated  Level of consciousness: awake and alert  Pain score: 3    Airway patency: patent  Anesthetic complications: no  Cardiovascular status: hemodynamically stable  Respiratory status: acceptable  Hydration status: euvolemic    PONV: none        No notable events documented.     Nurse Pain Score: 0 (NPRS)

## 2024-12-05 ENCOUNTER — PHARMACY VISIT (OUTPATIENT)
Dept: PHARMACY | Facility: MEDICAL CENTER | Age: 74
End: 2024-12-05
Payer: COMMERCIAL

## 2024-12-05 VITALS
WEIGHT: 164.9 LBS | RESPIRATION RATE: 18 BRPM | HEIGHT: 69 IN | TEMPERATURE: 98.4 F | DIASTOLIC BLOOD PRESSURE: 63 MMHG | BODY MASS INDEX: 24.42 KG/M2 | SYSTOLIC BLOOD PRESSURE: 118 MMHG | OXYGEN SATURATION: 96 % | HEART RATE: 79 BPM

## 2024-12-05 PROCEDURE — 700111 HCHG RX REV CODE 636 W/ 250 OVERRIDE (IP): Performed by: SURGERY

## 2024-12-05 PROCEDURE — A9270 NON-COVERED ITEM OR SERVICE: HCPCS | Performed by: SURGERY

## 2024-12-05 PROCEDURE — 700102 HCHG RX REV CODE 250 W/ 637 OVERRIDE(OP): Performed by: SURGERY

## 2024-12-05 RX ADMIN — DOXAZOSIN 1 MG: 2 TABLET ORAL at 05:07

## 2024-12-05 RX ADMIN — EZETIMIBE 10 MG: 10 TABLET ORAL at 05:07

## 2024-12-05 RX ADMIN — ENOXAPARIN SODIUM 30 MG: 100 INJECTION SUBCUTANEOUS at 05:07

## 2024-12-05 RX ADMIN — ASPIRIN 81 MG: 81 TABLET, COATED ORAL at 05:07

## 2024-12-05 RX ADMIN — DILTIAZEM HYDROCHLORIDE 240 MG: 120 CAPSULE, EXTENDED RELEASE ORAL at 05:05

## 2024-12-05 RX ADMIN — LEVOTHYROXINE SODIUM 150 MCG: 0.12 TABLET ORAL at 05:06

## 2024-12-05 RX ADMIN — METOPROLOL SUCCINATE 25 MG: 25 TABLET, EXTENDED RELEASE ORAL at 05:06

## 2024-12-05 RX ADMIN — RAMIPRIL 5 MG: 5 CAPSULE ORAL at 05:07

## 2024-12-05 ASSESSMENT — PAIN DESCRIPTION - PAIN TYPE: TYPE: ACUTE PAIN

## 2024-12-05 NOTE — CARE PLAN
The patient is Watcher - Medium risk of patient condition declining or worsening    Shift Goals  Clinical Goals: rest, pain control, mobility, CMS checks  Patient Goals: rest, pain control  Family Goals: KAITLYN    Progress made toward(s) clinical / shift goals:  Patient was able to rest intermittently overnight. Patient reported pain to be managed with PRN and scheduled medications. Educated on pharmacological and non pharmacological modalities. CMS intact. Patient able to mobilize with SBA overnight.     Patient is not progressing towards the following goals:

## 2024-12-05 NOTE — DISCHARGE INSTRUCTIONS
Femoral Endarterectomy, Care After  The following information offers guidance on how to care for yourself after your procedure. Your health care provider may also give you more specific instructions. If you have problems or questions, contact your health care provider.  What can I expect after the procedure?  After the procedure, it is common to have mild pain in the incision area.  Follow these instructions at home:  Incision care         Follow instructions from your health care provider about how to take care of your incision. Make sure you:  Wash your hands with soap and water for at least 20 seconds before and after you change your bandage (dressing). If soap and water are not available, use hand .  Change your dressing as told by your health care provider.  Leave stitches (sutures), skin glue, or adhesive strips in place. These skin closures may need to stay in place for 2 weeks or longer. If adhesive strip edges start to loosen and curl up, you may trim the loose edges. Do not remove adhesive strips completely unless your health care provider tells you to do that.  Do not  apply lotions, creams, or ointments unless your health care provider tells you to do that.  Check your incision area every day for signs of infection. Check for:  Redness, swelling, or more pain.  Fluid or blood.  Warmth.  Pus or a bad smell.  Do not take baths, swim, or use a hot tub until your health care provider approves. Ask your health care provider if you may take showers. You may only be allowed to take sponge baths.  Activity  If you were given a sedative during the procedure, it can affect you for several hours. Do not drive or operate machinery until your health care provider says that it is safe.  Rest as told by your health care provider.  Avoid sitting for a long time without moving. Get up to take short walks every 1-2 hours. This is important to improve blood flow and breathing. Ask for help if you feel weak or  unsteady.  Until your health care provider says that it is safe:  Do not lift anything that is heavier than 10 lb (4.5 kg), or the limit that you are told.  Do not do any activities that take a lot of effort or energy.  Do not take long car trips.  Do not travel by air.  Do not stand for long periods at a time.  Raise (elevate) your leg above the level of your heart while you are sitting or lying down.  Do exercises as told by your health care provider. Talk with your health care provider before starting a new exercise plan.  Return to your normal activities as told by your health care provider. Ask your health care provider what activities are safe for you.  Eating and drinking  Eat a heart-healthy diet that includes fruits, vegetables, and whole grains.  Avoid saturated fats.  Follow instructions from your health care provider about any other eating and drinking restrictions.  Lifestyle    Do not use any products that contain nicotine or tobacco. These products include cigarettes, chewing tobacco, and vaping devices, such as e-cigarettes. These can delay healing after surgery. If you need help quitting, ask your health care provider.  Maintain a healthy weight. Talk with your health care provider if you need help.  Medicines  Take over-the-counter and prescription medicines only as told by your health care provider.  Ask your health care provider if the medicine prescribed to you:  Requires you to avoid driving or using machinery.  Can cause constipation. You may need to take these actions to prevent or treat constipation:  Drink enough fluid to keep your urine pale yellow.  Take over-the-counter or prescription medicines.  Eat foods that are high in fiber, such as beans, whole grains, and fresh fruits and vegetables.  Limit foods that are high in fat and processed sugars, such as fried or sweet foods.  If you are taking blood thinners:  Talk with your health care provider before you take any medicines that  contain aspirin or NSAIDs, such as ibuprofen. These medicines increase your risk for dangerous bleeding.  Take your medicine exactly as told at the same time every day.  Avoid activities that could cause injury or bruising, and follow instructions about how to prevent falls.  Wear a medical alert bracelet or carry a card that lists what medicines you take.  General instructions  Wear compression stockings as told by your health care provider. These stockings help to prevent blood clots and reduce swelling in your legs.  Keep all follow-up visits. This is important.  Contact a health care provider if you have:  Signs of infection at your incision, such as:  Redness, swelling, or more pain.  Fluid or blood.  Warmth.  Pus or a bad smell.  A fever.  Pain that does not get better with medicine.  Pain in your leg while you walk.  Difficulty urinating.  Get help right away if:  You have severe pain in your leg.  You have shortness of breath.  You have chest pain.  You see red streaks going away from your incision.  You have redness, swelling, or warmth in your calf or leg.  Your leg or foot turns blue or gray or feels cold or numb.  These symptoms may represent a serious problem that is an emergency. Do not wait to see if the symptoms will go away. Get medical help right away. Call your local emergency services (911 in the [place] Do not drive yourself to the hospital.  Summary  After a femoral endarterectomy, it is common to have mild pain in the incision area.  Check your incision area every day for signs of infection, such as redness or swelling.  Follow instructions about rest and activity as told by your health care provider.  Do not use any products that contain nicotine or tobacco. These products include cigarettes, chewing tobacco, and vaping devices, such as e-cigarettes. If you need help quitting, ask your health care provider.  You should get help right away if you notice red streaks leading away from your  incision or if your leg or foot turns blue or gray or feels cold or numb.  This information is not intended to replace advice given to you by your health care provider. Make sure you discuss any questions you have with your health care provider.  Document Revised: 06/21/2021 Document Reviewed: 06/21/2021  Elsevier Patient Education © 2023 [employer]

## 2024-12-05 NOTE — PROGRESS NOTES
"Assumed care of patient at 1845. Bedside report received. Assessment complete.  AA&Ox4. Denies CP/SOB.  Reporting 0/10 pain. No intervention needed at this time.   Educated patient regarding pharmacologic and non pharmacologic modalities for pain management.  Skin per flowsheet.  Tolerating regular diet. Denies N/V at this time.   + void. - BM. Last BM 12/3  Pt ambulates with SBA.  All needs met at this time. Call light within reach. Pt calls appropriately. Bed low and locked, non skid socks in place. Hourly rounding in place.    BP (!) 155/76 Comment: Rn notified  Pulse 75   Temp 36.9 °C (98.4 °F) (Temporal)   Resp 18   Ht 1.753 m (5' 9\")   Wt 74.8 kg (164 lb 14.5 oz)   SpO2 94%   BMI 24.35 kg/m²     "

## 2024-12-05 NOTE — OR NURSING
Pt arrived to PACU from OR with anesthesiologist and OR RN. Oral airway D/C'd at 1132. AAOx4. Surgical site small amt of drainage marked, site soft. Bedrest started at 1126. Neuro intact, pedal pulses present with doppler. Pain controlled with pain medication, no nausea. Report given to JEREMY Hawthorne. Family Steph updated.

## 2024-12-05 NOTE — PROGRESS NOTES
Report received from RN, assumed care at 0645  Pt is A0X4, and responds appropriately   Pt declines any SOB, chest pain, new onset of numbness/ tingling  Pt rates pain at 0/10, on a scale of 1-10  Pt is voiding adequately and without hesitancy  Pt has + flatus, + bowel sounds, + BM on 12/4  Pt ambulates up self  Pt is tolerating a diet, pt denies any nausea/vomiting  Plan of care discussed, all questions answered. Explained importance of calling before getting OOB and pt verbalizes understanding. Explained importance of oral care. Call light is within reach, treaded slipper socks on, bed in lowest/ locked position, hourly rounding in place, all needs met at this time

## 2024-12-05 NOTE — PROGRESS NOTES
4 Eyes Skin Assessment Completed by JEREMY Hawthorne and JEREMY Ogden.    Head WDL  Ears WDL  Nose WDL  Mouth scab on chin  Neck WDL  Breast/Chest WDL  Shoulder Blades WDL  Spine WDL  (R) Arm/Elbow/Hand WDL  (L) Arm/Elbow/Hand WDL  Abdomen WDL  Groin Incision left groin site DIP  Scrotum/Coccyx/Buttocks WDL  (R) Leg varicose veins  (L) Leg varicose veins  (R) Heel/Foot/Toe WDL  (L) Heel/Foot/Toe WDL          Devices In Places Blood Pressure Cuff, Pulse Ox, and SCD's      Interventions In Place Pillows and Pressure Redistribution Mattress    Possible Skin Injury No    Pictures Uploaded Into Epic N/A  Wound Consult Placed N/A  RN Wound Prevention Protocol Ordered No

## 2024-12-05 NOTE — PROGRESS NOTES
Patient transferring to discharge lounge at this time. Discharge RN to provide d/c education, follow-up information, and remove PIV. Patient transporting via wheelchair. Belongings with patient at time of transfer. All questions and concerns addressed.

## 2024-12-05 NOTE — CARE PLAN
The patient is Stable - Low risk of patient condition declining or worsening    Shift Goals  Clinical Goals: complete admission, patient comfort, monitor surgical site  Patient Goals: feel better  Family Goals: KAITLYN    Progress made toward(s) clinical / shift goals:  Admission profile has been completed. Patient has been weaned to room air. Bed rest period ended and patient tolerated ambulation to/from restroom and also around unit. Post-op vitals continue. IVF initiated per order.    Patient is not progressing towards the following goals: Pending DC clearance.

## 2024-12-05 NOTE — DISCHARGE SUMMARY
DISCHARGE SUMMARY    Maxwell Salter  [id number]  [id number]  _____________________________________    DATE OF ADMISSION: 12/4/2024    DATE OF DISCHARGE:     ADMISSION DIAGNOSIS (ES):  Artery occlusion [I70.90]    DISCHARGE DIAGNOSIS (ES):  Artery occlusion [I70.90]    DISCHARGE CONDITION:  stable    CONSULTATIONS:  none    PROCEDURES:  12/4/2024      Revision right to left femoral to femoral artery bypass with bovine patch angioplasty of left femoral anastomosis  Left femoral endarterectomy    HOSPITAL COURSE:  Maxwell Salter is a 74 y.o. male     Patient underwent the above-mentioned procedure without complication and was admitted to [facility] postoperatively. he had an uneventful recovery and was discharged intact on postoperative day #1.    MEDICATIONS:  Current Outpatient Medications   Medication Sig Dispense Refill    HYDROcodone-acetaminophen (NORCO) 5-325 MG Tab per tablet Take 1 Tablet by mouth every four hours as needed (post op pain) for up to 2 days. 10 Tablet 0       FOLLOW-UP:  Please call my office at [phone] to make an appointment in 2 week(s)    DISCHARGE INSTRUCTIONS:  Resume preadmission diet.  Light activity for 4 weeks.  OK to shower and get incisions wet.  Call or go to ER if you have chest pain, shortness of breath, lightheadedness, stroke-like symptoms, fever, worsening pain, or any other concerns.    Lonnie Landis MD   [facility]

## 2024-12-05 NOTE — PROGRESS NOTES
DC instructions reviewed w/ pt, verbalized understanding. PIV dc'd, armband removed. Meds to bed delivered.

## 2024-12-19 ENCOUNTER — OFFICE VISIT (OUTPATIENT)
Dept: VASCULAR SURGERY | Facility: MEDICAL CENTER | Age: 74
End: 2024-12-19
Payer: MEDICARE

## 2024-12-19 VITALS
HEIGHT: 69 IN | TEMPERATURE: 98 F | WEIGHT: 165 LBS | SYSTOLIC BLOOD PRESSURE: 148 MMHG | BODY MASS INDEX: 24.44 KG/M2 | DIASTOLIC BLOOD PRESSURE: 70 MMHG | HEART RATE: 98 BPM | OXYGEN SATURATION: 98 %

## 2024-12-19 DIAGNOSIS — I77.9 PAOD (PERIPHERAL ARTERIAL OCCLUSIVE DISEASE) (HCC): ICD-10-CM

## 2024-12-19 PROCEDURE — 3077F SYST BP >= 140 MM HG: CPT | Performed by: PHYSICIAN ASSISTANT

## 2024-12-19 PROCEDURE — 99024 POSTOP FOLLOW-UP VISIT: CPT | Performed by: PHYSICIAN ASSISTANT

## 2024-12-19 PROCEDURE — 3078F DIAST BP <80 MM HG: CPT | Performed by: PHYSICIAN ASSISTANT

## 2024-12-19 ASSESSMENT — FIBROSIS 4 INDEX: FIB4 SCORE: 1.76

## 2024-12-19 NOTE — PROGRESS NOTES
VASCULAR SURGERY                 Clinic Progress Note  _________________________________    Vitals for Today's Visit (12/19/2024)  Vitals:    12/19/24 1006   BP: (!) 148/70   Pulse: 98   Temp: 36.7 °C (98 °F)   SpO2: 98%         12/19/2024  Mr. Salter presents for a post operative appointment. He recently underwent a revision of the right to left fem-fem bypass as well as a left femoral endarterectomy with Dr. Landis on 12/4/2024.   He has a past history of aortic endograft as well as a right to left femoral to femoral artery bypass that was performed about 2014.  There was some concern from vascular medicine that he had a stenosis within the left femoral anastomosis of his femoral to femoral artery bypass and underwent the above procedure.  He tells me today that prior to the surgery he experienced intermittent numbness of the left foot. He is doing well since the surgery. He has been ambulating throughout the day without significant pain, numbness, or tingling. He notes his incision is healing well.       Exam:  Pleasant male, no acute distress  Left groin c/d/I, staples in place  Abdomen soft, nontender   Fem fem bypass intact, brisk multiphasic doppler signal   Feet pink and warm,  DP/PT pulses brisk and multiphasic doppler signal   1+ pitting edema left foot   No wounds or ulcerations       A/P:  PAD   Hx of aortic endograft,  fem-fem bypass    Mr. Salter is doing well following his fem-fem bypass revision and left femoral endarterectomy with bovine patch. His staples were removed in the office today. I recommend he elevate his legs daily and wear light compression hose to reduce the pedal edema.  We will obtain arterial u/s and ALEXANDRE of the lower extremities in the coming months. He will see surgery and vasc medicine back as scheduled. Ample time was spent addressing his questions today, he will call us if there are any further questions or concerns.        Juliet Gold P.A.-C.  [facility]  [phone] [address], [place] [address]

## 2024-12-27 ENCOUNTER — HOSPITAL ENCOUNTER (OUTPATIENT)
Dept: RADIOLOGY | Facility: MEDICAL CENTER | Age: 74
End: 2024-12-27
Attending: PHYSICIAN ASSISTANT
Payer: MEDICARE

## 2024-12-27 DIAGNOSIS — I77.9 PAOD (PERIPHERAL ARTERIAL OCCLUSIVE DISEASE) (HCC): ICD-10-CM

## 2024-12-27 PROCEDURE — 93925 LOWER EXTREMITY STUDY: CPT

## 2024-12-27 PROCEDURE — 93925 LOWER EXTREMITY STUDY: CPT | Mod: 26 | Performed by: INTERNAL MEDICINE

## 2024-12-27 PROCEDURE — 93922 UPR/L XTREMITY ART 2 LEVELS: CPT

## 2024-12-27 PROCEDURE — 93922 UPR/L XTREMITY ART 2 LEVELS: CPT | Mod: 26 | Performed by: INTERNAL MEDICINE

## 2024-12-28 ENCOUNTER — APPOINTMENT (OUTPATIENT)
Dept: RADIOLOGY | Facility: MEDICAL CENTER | Age: 74
End: 2024-12-28
Attending: PHYSICIAN ASSISTANT
Payer: MEDICARE

## 2025-01-02 ENCOUNTER — APPOINTMENT (OUTPATIENT)
Dept: VASCULAR SURGERY | Facility: MEDICAL CENTER | Age: 75
End: 2025-01-02
Payer: MEDICARE

## 2025-01-02 VITALS
BODY MASS INDEX: 25.01 KG/M2 | TEMPERATURE: 98.6 F | OXYGEN SATURATION: 99 % | DIASTOLIC BLOOD PRESSURE: 76 MMHG | SYSTOLIC BLOOD PRESSURE: 144 MMHG | HEIGHT: 68 IN | WEIGHT: 165 LBS | HEART RATE: 82 BPM

## 2025-01-02 DIAGNOSIS — I77.9 PAOD (PERIPHERAL ARTERIAL OCCLUSIVE DISEASE) (HCC): ICD-10-CM

## 2025-01-02 PROCEDURE — 3078F DIAST BP <80 MM HG: CPT | Performed by: SURGERY

## 2025-01-02 PROCEDURE — 99024 POSTOP FOLLOW-UP VISIT: CPT | Performed by: SURGERY

## 2025-01-02 PROCEDURE — 3077F SYST BP >= 140 MM HG: CPT | Performed by: SURGERY

## 2025-01-02 ASSESSMENT — FIBROSIS 4 INDEX: FIB4 SCORE: 1.76

## 2025-01-02 NOTE — PROGRESS NOTES
"Thanks againokay so Lonnie Landis I see you can click on the okay okay got                 VASCULAR SURGERY                    Postop Note  _________________________________    1/2/2025    This patient is here to follow-up after  I need to be able to image from like the abdomen all the way down so review of his right to left femoral to femoral artery bypass with bovine patch angioplasty.    Patient reports doing well and has not concerns.      Vitals  BP (!) 144/76 (BP Location: Left arm, Patient Position: Sitting, BP Cuff Size: Adult)   Pulse 82   Temp 37 °C (98.6 °F) (Temporal)   Ht 1.727 m (5' 8\")   Wt 74.8 kg (165 lb)   SpO2 99%   BMI 25.09 kg/m²     Exam  Incision healing well  Clean, dry and intact  No erythema or drainage  Small lymphocele      DiagnosisAssessment:      Peripheral arterial disease stenosis femoral to femoral bypass left anastomosis    Status post revision of anastomosis with bovine patch angioplasty    Patient doing well  Has small seroma which should resolve    Plan:    Continue surveillance program follow-up femoral to femoral duplex in 3 months    The patient demonstrates a clear understanding of our discussion and agrees. They will reach out with any questions or concerns via phone or My Chart.    Follow up:     3 months with duplex      Lonnie Landis. MD [facility]  [phone]  [address], [place] [address]  "

## 2025-01-07 ENCOUNTER — HOSPITAL ENCOUNTER (OUTPATIENT)
Dept: LAB | Facility: MEDICAL CENTER | Age: 75
End: 2025-01-07
Attending: INTERNAL MEDICINE
Payer: MEDICARE

## 2025-01-07 DIAGNOSIS — E78.00 PURE HYPERCHOLESTEROLEMIA: ICD-10-CM

## 2025-01-07 DIAGNOSIS — I10 PRIMARY HYPERTENSION: ICD-10-CM

## 2025-01-07 LAB
ALBUMIN SERPL BCP-MCNC: 3.8 G/DL (ref 3.2–4.9)
ALBUMIN/GLOB SERPL: 1.1 G/DL
ALP SERPL-CCNC: 98 U/L (ref 30–99)
ALT SERPL-CCNC: 14 U/L (ref 2–50)
ANION GAP SERPL CALC-SCNC: 11 MMOL/L (ref 7–16)
AST SERPL-CCNC: 16 U/L (ref 12–45)
BILIRUB SERPL-MCNC: 0.5 MG/DL (ref 0.1–1.5)
BUN SERPL-MCNC: 23 MG/DL (ref 8–22)
CALCIUM ALBUM COR SERPL-MCNC: 8.9 MG/DL (ref 8.5–10.5)
CALCIUM SERPL-MCNC: 8.7 MG/DL (ref 8.4–10.2)
CHLORIDE SERPL-SCNC: 105 MMOL/L (ref 96–112)
CHOLEST SERPL-MCNC: 133 MG/DL (ref 100–199)
CO2 SERPL-SCNC: 26 MMOL/L (ref 20–33)
CREAT SERPL-MCNC: 1.63 MG/DL (ref 0.5–1.4)
FASTING STATUS PATIENT QL REPORTED: NORMAL
GFR SERPLBLD CREATININE-BSD FMLA CKD-EPI: 44 ML/MIN/1.73 M 2
GLOBULIN SER CALC-MCNC: 3.4 G/DL (ref 1.9–3.5)
GLUCOSE SERPL-MCNC: 96 MG/DL (ref 65–99)
HDLC SERPL-MCNC: 61 MG/DL
LDLC SERPL CALC-MCNC: 63 MG/DL
POTASSIUM SERPL-SCNC: 3.9 MMOL/L (ref 3.6–5.5)
PROT SERPL-MCNC: 7.2 G/DL (ref 6–8.2)
SODIUM SERPL-SCNC: 142 MMOL/L (ref 135–145)
TRIGL SERPL-MCNC: 43 MG/DL (ref 0–149)

## 2025-01-07 PROCEDURE — 83695 ASSAY OF LIPOPROTEIN(A): CPT

## 2025-01-07 PROCEDURE — 80061 LIPID PANEL: CPT

## 2025-01-07 PROCEDURE — 80053 COMPREHEN METABOLIC PANEL: CPT

## 2025-01-07 PROCEDURE — 82043 UR ALBUMIN QUANTITATIVE: CPT

## 2025-01-07 PROCEDURE — 36415 COLL VENOUS BLD VENIPUNCTURE: CPT

## 2025-01-07 PROCEDURE — 82570 ASSAY OF URINE CREATININE: CPT

## 2025-01-07 PROCEDURE — 82172 ASSAY OF APOLIPOPROTEIN: CPT

## 2025-01-08 LAB
CREAT UR-MCNC: 73.5 MG/DL
MICROALBUMIN UR-MCNC: <1.2 MG/DL
MICROALBUMIN/CREAT UR: NORMAL MG/G (ref 0–30)

## 2025-01-09 LAB
APO B100 SERPL-MCNC: 72 MG/DL (ref 66–133)
LPA SERPL-MCNC: 111 MG/DL

## 2025-01-16 ENCOUNTER — OFFICE VISIT (OUTPATIENT)
Dept: CARDIOLOGY | Facility: MEDICAL CENTER | Age: 75
End: 2025-01-16
Attending: INTERNAL MEDICINE
Payer: MEDICARE

## 2025-01-16 VITALS
HEIGHT: 69 IN | DIASTOLIC BLOOD PRESSURE: 73 MMHG | BODY MASS INDEX: 24.73 KG/M2 | HEART RATE: 73 BPM | SYSTOLIC BLOOD PRESSURE: 157 MMHG | WEIGHT: 167 LBS

## 2025-01-16 DIAGNOSIS — Z76.0 MEDICATION REFILL: ICD-10-CM

## 2025-01-16 DIAGNOSIS — I71.40 ABDOMINAL AORTIC ANEURYSM (AAA) WITHOUT RUPTURE, UNSPECIFIED PART (HCC): ICD-10-CM

## 2025-01-16 DIAGNOSIS — I10 PRIMARY HYPERTENSION: ICD-10-CM

## 2025-01-16 DIAGNOSIS — E78.00 PURE HYPERCHOLESTEROLEMIA: ICD-10-CM

## 2025-01-16 DIAGNOSIS — E03.9 HYPOTHYROIDISM, UNSPECIFIED TYPE: ICD-10-CM

## 2025-01-16 DIAGNOSIS — N18.31 STAGE 3A CHRONIC KIDNEY DISEASE: ICD-10-CM

## 2025-01-16 DIAGNOSIS — I73.9 PAD (PERIPHERAL ARTERY DISEASE) (HCC): ICD-10-CM

## 2025-01-16 DIAGNOSIS — E78.41 HIGH SERUM LIPOPROTEIN(A): ICD-10-CM

## 2025-01-16 PROCEDURE — 3078F DIAST BP <80 MM HG: CPT | Performed by: INTERNAL MEDICINE

## 2025-01-16 PROCEDURE — 99214 OFFICE O/P EST MOD 30 MIN: CPT | Performed by: INTERNAL MEDICINE

## 2025-01-16 PROCEDURE — 99212 OFFICE O/P EST SF 10 MIN: CPT

## 2025-01-16 PROCEDURE — 3077F SYST BP >= 140 MM HG: CPT | Performed by: INTERNAL MEDICINE

## 2025-01-16 PROCEDURE — G2211 COMPLEX E/M VISIT ADD ON: HCPCS | Performed by: INTERNAL MEDICINE

## 2025-01-16 RX ORDER — ATORVASTATIN CALCIUM 80 MG/1
40 TABLET, FILM COATED ORAL NIGHTLY
Qty: 100 TABLET | Refills: 3 | Status: SHIPPED | OUTPATIENT
Start: 2025-01-16

## 2025-01-16 RX ORDER — DOXAZOSIN 2 MG/1
1 TABLET ORAL DAILY
Qty: 90 TABLET | Refills: 3 | Status: SHIPPED | OUTPATIENT
Start: 2025-01-16 | End: 2025-01-16

## 2025-01-16 RX ORDER — DOXAZOSIN 2 MG/1
2 TABLET ORAL DAILY
Qty: 90 TABLET | Refills: 3 | Status: SHIPPED | OUTPATIENT
Start: 2025-01-16 | End: 2025-01-21

## 2025-01-16 ASSESSMENT — FIBROSIS 4 INDEX: FIB4 SCORE: 1.59

## 2025-01-16 NOTE — PROGRESS NOTES
VASCULAR MEDICINE CLINIC - Follow up VISIT  01/16/25    Maxwell Salter is a 74 y.o. male   Referring provider: Lenora Ty M.D.     Subjective      HPI:   Here for f/u of AAA and PAD s/p EVAR and fem-fem bypass in setting of CAD s/p CABG, htn, and dyslipidemia with stage 3a ckd    CAD  1990s coronary pci  2014 had cabg  No chest pain  [place]    AAA/PAD  EVAR in 2012  Had fem-fem 2014 for acute failure of distal limb  No claudication  Does feel that his legs are weaker than they should be -he uses the term atrophy  He had a revision of his femorofemoral bypass in January  He is back to walking  No claudication    Dyslipidemia  Has been on atorvastatin for a long time - good adherence  Started ezetimibe in the past year  Good adherence  No myalgias  Found to have high lipoprotein a on recent testing    BP/ckd  Has tapered down metoprolol - now on 25 mg daily  Now on increased dose of ramipril 5 bid  he still takes diltiazem 240 mg daily  Started on low dose doxazosin  He is not on a diuretic  Stable stage 3a dz  Denies excess nsaids  Has not been taking blood pressure that often at home    SILVIO  Previously on cpap - not currently  Denies symptoms since losing weight      Social History     Tobacco Use    Smoking status: Former     Current packs/day: 0.00     Average packs/day: 1 pack/day for 43.7 years (43.7 ttl pk-yrs)     Types: Cigarettes     Start date: 9/1/1970     Quit date: 6/1/2014     Years since quitting: 10.6    Smokeless tobacco: Never   Vaping Use    Vaping status: Never Used   Substance Use Topics    Alcohol use: Yes     Alcohol/week: 1.2 oz     Types: 2 Cans of beer per week    Drug use: Never     DIET AND EXERCISE:  Weight Change:down 30 pounds since moving to [place] - now stable  Diet: relatively low fat - whole foods  Exercise: Walks every day        Objective      Vitals:    01/16/25 1330 01/16/25 1335   BP: (!) 160/76 (!) 157/73   BP Location: Left arm Left arm   Patient Position:  "Sitting Sitting   BP Cuff Size: Adult Adult   Pulse: 73 73   Weight: 75.8 kg (167 lb)    Height: 1.753 m (5' 9\")      BP Readings from Last 4 Encounters:   01/16/25 (!) 157/73   01/02/25 (!) 144/76   12/19/24 (!) 148/70   12/05/24 118/63      Body mass index is 24.66 kg/m².   Wt Readings from Last 4 Encounters:   01/16/25 75.8 kg (167 lb)   01/02/25 74.8 kg (165 lb)   12/19/24 74.8 kg (165 lb)   12/04/24 74.8 kg (164 lb 14.5 oz)      Physical Exam  Vitals reviewed.   Constitutional:       General: He is not in acute distress.     Appearance: He is not diaphoretic.   HENT:      Head: Normocephalic and atraumatic.   Eyes:      General: No scleral icterus.     Conjunctiva/sclera: Conjunctivae normal.   Neck:      Vascular: No carotid bruit.   Cardiovascular:      Rate and Rhythm: Normal rate and regular rhythm.      Heart sounds: Murmur heard.      Comments: 1+ DP and PT pulse in the right  No pulse palpable left foot but does appear warm and well-perfused  Pulmonary:      Effort: Pulmonary effort is normal. No respiratory distress.      Breath sounds: Normal breath sounds. No wheezing or rales.   Musculoskeletal:      Right lower leg: No edema.      Left lower leg: No edema.   Skin:     Coloration: Skin is not pale.   Neurological:      General: No focal deficit present.      Mental Status: He is alert and oriented to person, place, and time.      Cranial Nerves: No cranial nerve deficit.      Coordination: Coordination normal.      Gait: Gait is intact. Gait normal.   Psychiatric:         Mood and Affect: Mood and affect normal.         Behavior: Behavior normal.          DATA REVIEW    Lab Results   Component Value Date/Time    CHOLSTRLTOT 133 01/07/2025 07:57 AM    LDL 63 01/07/2025 07:57 AM    HDL 61 01/07/2025 07:57 AM    TRIGLYCERIDE 43 01/07/2025 07:57 AM       Lab Results   Component Value Date/Time    LIPOPROTA 111 (H) 01/07/2025 07:57 AM      Lab Results   Component Value Date/Time    APOB 72 01/07/2025 " "07:57 AM      No results found for: \"CRPHIGHSEN\"     Lab Results   Component Value Date/Time    SODIUM 142 01/07/2025 07:57 AM    POTASSIUM 3.9 01/07/2025 07:57 AM    CHLORIDE 105 01/07/2025 07:57 AM    CO2 26 01/07/2025 07:57 AM    GLUCOSE 96 01/07/2025 07:57 AM    BUN 23 (H) 01/07/2025 07:57 AM    CREATININE 1.63 (H) 01/07/2025 07:57 AM     Lab Results   Component Value Date/Time    ALKPHOSPHAT 98 01/07/2025 07:57 AM    ASTSGOT 16 01/07/2025 07:57 AM    ALTSGPT 14 01/07/2025 07:57 AM    TBILIRUBIN 0.5 01/07/2025 07:57 AM       No results found for: \"HBA1C\"    Lab Results   Component Value Date/Time    MALBCRT see below 01/07/2025 07:56 AM    MICROALBUR <1.2 01/07/2025 07:56 AM       Cardiovascular Imaging:    Aortoiliac duplex aug 2024  Evar without endoleak  Occluded left limb  Right limb patent    LE arterial aug 2024  Stenosis at distal anastomosis of left-right fem-fem  High grade stenosis right SFA  Occluded left SFA  3 vessel runoff bilat  Biphasic distal flow on left  Monophasic distal flow on right    CTA aortogram with runoff sep 2024  1.  Aortobiiliac stent graft in place with findings highly suspicious for endoleak proximally and at the aortic bifurcation  2.  Occlusion of the LEFT iliac segment of the stent graft with segmental reconstitution around the iliac bifurcation  3.  RIGHT to LEFT femoral-femoral bypass graft in place with findings suspicious for weblike stenosis at the LEFT common femoral arterial anastomosis  4.  Extensive atherosclerotic disease affecting the BILATERAL lower extremities and visceral arteries as detailed above  5.  RIGHT renal atrophy  6.  Colonic diverticulosis  7.  Cardiomegaly  8.  Cholelithiasis        Medical Decision Making:  Today's Assessment / Status / Plan:     1. Stage 3a chronic kidney disease        2. Primary hypertension  doxazosin (CARDURA) 2 MG Tab    DISCONTINUED: doxazosin (CARDURA) 2 MG Tab      3. Hypothyroidism, unspecified type        4. Pure " hypercholesterolemia  Bempedoic Acid-Ezetimibe 180-10 MG Tab      5. Abdominal aortic aneurysm (AAA) without rupture, unspecified part (HCC)  US-AORTA/ILIACS DUPLEX COMPLETE      6. High serum lipoprotein(a)        7. Medication refill  atorvastatin (LIPITOR) 80 MG tablet      8. PAD (peripheral artery disease) (HCC)  US-EXTREMITY ARTERY LOWER BILAT W/ALEXANDRE (COMBO)    rivaroxaban (XARELTO) 2.5 MG tablet         Etiology of Established CVD if Present:     1) CAD status post CABG 2012 -no recurrent events or interventions.  No angina.  Great exercise tolerance  -Continue medical management as per below    2) AAA status post EVAR approximately 2013 - We discussed the importance of lifelong surveillance. No endoleak on duplex but possible on CTA  -Medical management lifestyle modifications per below  -Repeat aortoiliac duplex in April 2025    3) PAD -status post femorofemoral as above with revision of femorofemoral bypass in January 2025.  He does have some more distal atherosclerotic disease per previous imaging.  He does not have classic claudication but does say both his legs are weaker than he thinks he should be for the level of exercise.  He uses the term atrophy.  His symptoms are better after intervention  -continue medical management and lifestyle mod per below  -Repeat bilateral lower extremity arterial duplex with attention to femorofemoral bypass in April 2025    LIPID MANAGEMENT  Qualifies for Statin Therapy Based on 2018 ACC/AHA Guidelines: yes, Secondary prevention - <76yo, ASCVD not at very high risk  Major ASCVD events: CABG and EVAR as above  High-risk conditions: Recurrent event and disease in more than 1 territory  Risk-enhancers: Very high lipoprotein a  Currently on Statin: Yes  Tx goals: LDL less than 55 and apolipoprotein B less than 60  At goal?  No  Plan:   -Increase atorvastatin to 80 mg daily  -Change ezetimibe to nexlizet affordable-sent to pharmacy benefits managers  -Consider lipoprotein a  lowering medications when and if they become available  -Recommended cascade screening of lipid panel and lipoprotein a of all first-degree relatives  -Check echocardiogram in the future to rule out calcific aortic valve disease  -Will need repeat fasting lipid panel and apolipoprotein B in the future    BLOOD PRESSURE MANAGEMENT  BP Goal ACC/AHA (2017) goal <130/80  Home BP at goal: Unknown  Office BP at goal:  no  24h ABPM:  not ordered to date  RDN candidate? UNDECIDED  Contributing factors: ckd  No albuminuria 2024  Plan:   Monitoring:   - continue home BP monitoring  - consider ABPM if has further evidence white coat  Medications:  -Continue diltiazem 240 mg daily  -continue ramipril 5 mg twice daily for now  -Taper off metoprolol per my instructions  -Increase doxazosin to 2 mg each night    GLYCEMIC MANAGEMENT Normal  -Continue lifestyle modification    ANTITHROMBOTIC THERAPY   Elevated lipoprotein a may be prothrombotic and he is at risk of further graft occlusion.  Overall I think the benefits of dual pathway inhibition outweigh the risk of bleeding  -Continue aspirin 81 mg daily  -Start Xarelto 2.5 mg twice daily -sent to pharmacy     LIFESTYLE INTERVENTIONS:    Tobacco Use: Quit many years ago  - continued complete avoidance of all tobacco products     Physical Activity: Continue excellent exercise routine, particularly his daily walking    Weight Management and/or Nutrition: Maintain 30 pound weight loss.  Continue avoid processed foods    OTHER:     -CKD, stage IIIa/b -appears stable. No albuminuria in 2024. Blood pressure control and ACE inhibitor as above.  Avoid excess NSAIDs. Recheck gfr, lytes and cbc in future    -Hypothyroidism -appears stable based on symptoms and most recent TSH.  Continue current thyroxine as ordered by PCP. Recheck tsh and t4 prior to next visit    -SILVIO -previously on CPAP but has discontinued as he feels his symptoms have resolved with weight loss.  Defer any  further workup and management to PCP and/or sleep medicine    - anemia, mild - may be due to ckd but other etiologies certainly in differential. Defer further w/u and management to pcp. Repeat cbc    Studies to Be Obtained: Aortoiliac duplex and bilateral lower extremity arterial duplex and attention to femorofemoral bypass April 2025  Labs to Be Obtained: None currently    Follow up in: 1 mo to assess affordability, availability, and tolerability of these new medications    Time: 33 min - - chart review/prep, review of other providers' records, imaging/lab review, face-to-face time for history/examination, ordering, prescribing,  review of results/meds/ treatment plan with patient/family/caregiver, documentation in EMR, care coordination (as needed)    Michael J Bloch, M.D.   [facility]  [facility]  [phone]

## 2025-01-16 NOTE — PATIENT INSTRUCTIONS
CHOLESTEROL  - increase atorvastatin to 80 mg daily (new prescription)  - change ezetimibe to nexlizet (assuming affordable)    BLOOD PRESSURE  - continue ramipril 5 mg 2x daily  - continue diltiazem   - decrease metoprolol to 1/2 tab for 2 weeks and then stop  - increase doxazosin to a whole tab each night    BLOOD THINNER  - continue low dose aspirin 81 mg daily  - add xarelto 2.5 mg 2x daily (assuming it affordable)    Michael Bloch, MD  [facility]  [phone]

## 2025-01-17 ENCOUNTER — TELEPHONE (OUTPATIENT)
Dept: CARDIOLOGY | Facility: MEDICAL CENTER | Age: 75
End: 2025-01-17
Payer: MEDICARE

## 2025-01-17 ENCOUNTER — TELEPHONE (OUTPATIENT)
Dept: VASCULAR LAB | Facility: MEDICAL CENTER | Age: 75
End: 2025-01-17
Payer: MEDICARE

## 2025-01-17 DIAGNOSIS — I10 PRIMARY HYPERTENSION: ICD-10-CM

## 2025-01-17 NOTE — TELEPHONE ENCOUNTER
----- Message from Physician Michael Bloch, M.D. sent at 1/16/2025  1:57 PM PST -----  Regarding: home bp monitoring  Ps remind patient that I want him to take his BP every other day and write it down.   I forgot to remind him

## 2025-01-17 NOTE — TELEPHONE ENCOUNTER
Received New Start PA request via [employer]  for Nexletol 180-10mg and Eliquis 2.5mg.(Day Supply:90ds)     Insurance: Lala Calero  Member ID:  [id number]  BIN: [id number]  PCN: [id number]  Group: [id number]     Ran test claim via [employer] & medication Nexletol 745.95/90ds and Eliquis 2.5mg 861.70/90ds due to insurance deductible. Called and spoke with the patient, I explained to him that this year, because of the $2,000 deductible, insurance plans are offering payment plans. He declined. I also did a [employer] screening to help with Nexletol, but he makes more than the FPL of $75,000. For Eliquis, I offered 340B pricing at our pharmacy, but he declined that offer too and stated he was going to talk to Dr. Bloch because it's too much money.

## 2025-01-17 NOTE — TELEPHONE ENCOUNTER
Called and s/w patient   Patient will keep track of BP every other day.    Patient also was unable to do pricing for Nexletol 180-10mg and Eliquis 2.5mg.(Day Supply:90ds)   But according to patient he was supposed to start Xarelto.  Patient is currently not taking either medications.    Rx coordinator already sent message to Dr Bloch.    Kerry Ho, Med Ass't  [facility]  Ph. [phone]  Fx. [phone]

## 2025-01-21 ENCOUNTER — TELEPHONE (OUTPATIENT)
Dept: VASCULAR LAB | Facility: MEDICAL CENTER | Age: 75
End: 2025-01-21
Payer: MEDICARE

## 2025-01-21 RX ORDER — DOXAZOSIN 2 MG/1
2 TABLET ORAL NIGHTLY
Qty: 100 TABLET | Refills: 3 | Status: SHIPPED | OUTPATIENT
Start: 2025-01-21

## 2025-01-21 NOTE — TELEPHONE ENCOUNTER
Received New start PA request via [employer]  for NEXLIZET 180-10MG TABLETS . (Quantity:90, Day Supply:90)     Insurance: [employer]       Ran Test claim via [employer] & medication Pays for a $745.95 copay. Will outreach to patient to offer specialty pharmacy services and or release to preferred pharmacy    PRISCILLA Larios, PhT  Vascular Pharmacy Liaison (Rx Coordinator)  P: [phone]  1/21/2025 12:05 PM

## 2025-01-21 NOTE — TELEPHONE ENCOUNTER
Received New start PA request via [employer]  for XARELTO 2.5MG TABLETS. (Quantity:90, Day Supply:90)     Insurance: [employer]     Ran Test claim via [employer] & medication Pays for a $573.85 copay. Will outreach to patient to offer specialty pharmacy services and or release to preferred pharmacy    PRISCILLA Larios, PhT  Vascular Pharmacy Liaison (Rx Coordinator)  P: [phone]  1/21/2025 12:04 PM

## 2025-01-28 ENCOUNTER — PATIENT MESSAGE (OUTPATIENT)
Dept: VASCULAR LAB | Facility: MEDICAL CENTER | Age: 75
End: 2025-01-28
Payer: MEDICARE

## 2025-02-06 DIAGNOSIS — Z76.0 MEDICATION REFILL: ICD-10-CM

## 2025-02-06 NOTE — TELEPHONE ENCOUNTER
Received request via: Pharmacy    Was the patient seen in the last year in this department? Yes    Does the patient have an active prescription (recently filled or refills available) for medication(s) requested? No    [facility]    Does the patient have assisted Plus and need 100-day supply? (This applies to ALL medications) Patient does not have SCP

## 2025-02-07 RX ORDER — DILTIAZEM HYDROCHLORIDE 240 MG/1
240 CAPSULE, COATED, EXTENDED RELEASE ORAL DAILY
Qty: 90 CAPSULE | Refills: 0 | Status: SHIPPED | OUTPATIENT
Start: 2025-02-07 | End: 2025-02-27 | Stop reason: SDUPTHER

## 2025-02-20 ENCOUNTER — TELEPHONE (OUTPATIENT)
Dept: MEDICAL GROUP | Facility: MEDICAL CENTER | Age: 75
End: 2025-02-20
Payer: MEDICARE

## 2025-02-20 NOTE — TELEPHONE ENCOUNTER
Caller Name: Bhavik [facility]  Call Back Number: [phone]    Message: Received a voice message from Bhavik [facility], he was calling about a refill on Diltiazem. Called [facility] and informed them that pt received medication at a different pharmacy.

## 2025-02-27 ENCOUNTER — TELEPHONE (OUTPATIENT)
Dept: MEDICAL GROUP | Facility: MEDICAL CENTER | Age: 75
End: 2025-02-27
Payer: MEDICARE

## 2025-02-27 DIAGNOSIS — Z76.0 MEDICATION REFILL: ICD-10-CM

## 2025-02-27 DIAGNOSIS — N52.9 ERECTILE DYSFUNCTION, UNSPECIFIED ERECTILE DYSFUNCTION TYPE: ICD-10-CM

## 2025-02-27 DIAGNOSIS — I10 PRIMARY HYPERTENSION: ICD-10-CM

## 2025-02-27 DIAGNOSIS — E78.00 PURE HYPERCHOLESTEROLEMIA: ICD-10-CM

## 2025-02-27 DIAGNOSIS — I73.9 PAD (PERIPHERAL ARTERY DISEASE) (HCC): ICD-10-CM

## 2025-02-27 RX ORDER — TADALAFIL 20 MG/1
20 TABLET ORAL PRN
Qty: 10 TABLET | Refills: 3 | Status: SHIPPED | OUTPATIENT
Start: 2025-02-27

## 2025-02-27 RX ORDER — OMEPRAZOLE 40 MG/1
40 CAPSULE, DELAYED RELEASE ORAL DAILY
Qty: 90 CAPSULE | Refills: 3 | Status: SHIPPED | OUTPATIENT
Start: 2025-02-27

## 2025-02-27 RX ORDER — ATORVASTATIN CALCIUM 80 MG/1
40 TABLET, FILM COATED ORAL NIGHTLY
Qty: 100 TABLET | Refills: 3 | Status: SHIPPED | OUTPATIENT
Start: 2025-02-27

## 2025-02-27 RX ORDER — DOXAZOSIN 2 MG/1
2 TABLET ORAL NIGHTLY
Qty: 100 TABLET | Refills: 3 | Status: SHIPPED | OUTPATIENT
Start: 2025-02-27

## 2025-02-27 RX ORDER — DILTIAZEM HYDROCHLORIDE 240 MG/1
240 CAPSULE, COATED, EXTENDED RELEASE ORAL DAILY
Qty: 90 CAPSULE | Refills: 0 | Status: SHIPPED | OUTPATIENT
Start: 2025-02-27

## 2025-02-27 RX ORDER — RAMIPRIL 5 MG/1
5 CAPSULE ORAL
Qty: 180 CAPSULE | Refills: 3 | Status: SHIPPED | OUTPATIENT
Start: 2025-02-27

## 2025-02-27 RX ORDER — LEVOTHYROXINE SODIUM 150 UG/1
150 TABLET ORAL
Qty: 90 TABLET | Refills: 3 | Status: SHIPPED | OUTPATIENT
Start: 2025-02-27

## 2025-02-27 NOTE — TELEPHONE ENCOUNTER
1. Name: Maxwell Salter      Call Back Number: There are no phone numbers on file.        How would the patient prefer to be contacted with a response: Phone call OK to leave a detailed message    Pt bill came in person asking for refills on all his meds due to having [employer] medicare advantage he knows its not covered and needs meds filled until his new appt at [facility] not [facility] as of 03/19/25.  If you have any questions please call him or send my chart message.     Thank you

## 2025-02-27 NOTE — TELEPHONE ENCOUNTER
Please call patient and let him know that I have refilled all of his medications    Thank You,  Dr. Ty

## 2025-04-04 DIAGNOSIS — Z76.0 MEDICATION REFILL: ICD-10-CM

## 2025-04-07 RX ORDER — LEVOTHYROXINE SODIUM 150 UG/1
150 TABLET ORAL
Qty: 90 TABLET | Refills: 0 | Status: SHIPPED | OUTPATIENT
Start: 2025-04-07

## 2025-07-03 DIAGNOSIS — Z76.0 MEDICATION REFILL: ICD-10-CM

## 2025-07-03 RX ORDER — OMEPRAZOLE 40 MG/1
40 CAPSULE, DELAYED RELEASE ORAL DAILY
Qty: 90 CAPSULE | Refills: 3 | Status: SHIPPED | OUTPATIENT
Start: 2025-07-03

## 2025-07-03 NOTE — TELEPHONE ENCOUNTER
Received request via: Pharmacy    Was the patient seen in the last year in this department? Yes    Does the patient have an active prescription (recently filled or refills available) for medication(s) requested? No    Pharmacy Name: [facility]    Does the patient have [employer] Plus and need 100-day supply? (This applies to ALL medications) Patient does not have SCP

## 2025-07-14 ENCOUNTER — TELEPHONE (OUTPATIENT)
Dept: VASCULAR LAB | Facility: MEDICAL CENTER | Age: 75
End: 2025-07-14
Payer: MEDICARE

## 2025-07-14 DIAGNOSIS — Z76.0 MEDICATION REFILL: ICD-10-CM

## 2025-07-14 RX ORDER — RAMIPRIL 5 MG/1
5 CAPSULE ORAL
Qty: 180 CAPSULE | Refills: 3 | Status: SHIPPED | OUTPATIENT
Start: 2025-07-14

## 2025-07-14 NOTE — TELEPHONE ENCOUNTER
Bloch    Coumadin/Pharmacotherapy/Vascular RX Refill Request    PT Name: Maxwell Salter     Medication: ramipril (ALTACE) 5 MG Cap [[id number]]     Best Call Back Number:   [phone] opt 2 (pharmacy)     Ref #: [id number]    Any special instructions: n/a    Thank you,  Damien MAY

## (undated) DEVICE — SYRINGE 30 ML LL (56/BX)

## (undated) DEVICE — CHLORAPREP 26 ML APPLICATOR - ORANGE TINT(25/CA)

## (undated) DEVICE — SODIUM CHL IRRIGATION 0.9% 1000ML (12EA/CA)

## (undated) DEVICE — DRAPE IOBAN II INCISE 23X17 - (10EA/BX 4BX/CA)

## (undated) DEVICE — SUTURE 2-0 SILK 12 X 18" (36PK/BX)"

## (undated) DEVICE — SUTURE 6-0 PROLENE BV-1 D/A 24 (36PK/BX)"

## (undated) DEVICE — SUTURE GENERAL

## (undated) DEVICE — GLOVE BIOGEL SZ 6 PF LATEX - (50EA/BX 4BX/CA)

## (undated) DEVICE — SUTURE 2-0 VICRYL PLUS CT-1 36 (36PK/BX)"

## (undated) DEVICE — CANISTER SUCTION 3000ML MECHANICAL FILTER AUTO SHUTOFF MEDI-VAC NONSTERILE LF DISP (40EA/CA)

## (undated) DEVICE — SPONGE GAUZESTER 4 X 4 4PLY - (128PK/CA)

## (undated) DEVICE — KIT SURGIFLO W/OUT THROMBIN - (6EA/BX)

## (undated) DEVICE — VESSELOOP MAXI BLUE STERILE- SURG-I-LOOP (10EA/BX)

## (undated) DEVICE — LACTATED RINGERS INJ 1000 ML - (14EA/CA 60CA/PF)

## (undated) DEVICE — STAPLER SKIN DISP - (6/BX 10BX/CA) VISISTAT

## (undated) DEVICE — GOWN SURGEONS LARGE - (32/CA)

## (undated) DEVICE — DRESSING TRANSPARENT FILM TEGADERM 4 X 4.75" (50EA/BX)"

## (undated) DEVICE — SLEEVE, VASO, THIGH, MED

## (undated) DEVICE — VESSELOOP MINI BLUE STERILE - SURG-I-LOOP (10EA/BX)

## (undated) DEVICE — SOD. CHL. INJ. 0.9% 1000 ML - (14EA/CA 60CA/PF)

## (undated) DEVICE — DECANTER FLD BLS - (50/CA)

## (undated) DEVICE — SODIUM CHL. INJ. 0.9% 500ML (24EA/CA 50CA/PF)

## (undated) DEVICE — TUBING CLEARLINK DUO-VENT - C-FLO (48EA/CA)

## (undated) DEVICE — SENSOR OXIMETER ADULT SPO2 RD SET (20EA/BX)

## (undated) DEVICE — PAD LAP STERILE 18 X 18 - (5/PK 40PK/CA)

## (undated) DEVICE — PACK AV FISTULA (2EA/CA)

## (undated) DEVICE — GOWN WARMING STANDARD FLEX - (30/CA)

## (undated) DEVICE — SUTURE CV

## (undated) DEVICE — SUCTION INSTRUMENT YANKAUER BULBOUS TIP W/O VENT (50EA/CA)

## (undated) DEVICE — SUTURE 2-0 VICRYL PLUS CT-1 - 8 X 18 INCH(12/BX)

## (undated) DEVICE — DRAPE SURGICAL U 77X120 - (10/CA)

## (undated) DEVICE — COVER LIGHT HANDLE ALC PLUS DISP (18EA/BX)

## (undated) DEVICE — GLOVE BIOGEL PI ORTHO SZ 6 SURGICAL PF LF (40PR/BX)

## (undated) DEVICE — SET LEADWIRE 5 LEAD BEDSIDE DISPOSABLE ECG (1SET OF 5/EA)

## (undated) DEVICE — SUTURE 4-0 30CM STRATAFIX SPIRAL PS-2 (12EA/BX)

## (undated) DEVICE — TOWELS CLOTH SURGICAL - (4/PK 20PK/CA)

## (undated) DEVICE — SUTURE 3-0 SILK 12 X 18 IN - (36/BX)

## (undated) DEVICE — GOWN SURGEONS X-LARGE - DISP. (30/CA)

## (undated) DEVICE — DRAPE LARGE 3 QUARTER - (20/CA)

## (undated) DEVICE — GLOVE BIOGEL SZ 8 SURGICAL PF LTX - (50PR/BX 4BX/CA)

## (undated) DEVICE — ELECTRODE DUAL RETURN W/ CORD - (50/PK)

## (undated) DEVICE — SUTURE 5-0 PROLENE BLUE C-1 HS 1 X 30 (36EA/BX)"

## (undated) DEVICE — SET EXTENSION WITH 2 PORTS (48EA/CA) ***PART #2C8610 IS A SUBSTITUTE*****